# Patient Record
Sex: FEMALE | Race: OTHER
[De-identification: names, ages, dates, MRNs, and addresses within clinical notes are randomized per-mention and may not be internally consistent; named-entity substitution may affect disease eponyms.]

---

## 2021-08-10 ENCOUNTER — HOSPITAL ENCOUNTER (INPATIENT)
Dept: HOSPITAL 15 - ER | Age: 66
LOS: 2 days | Discharge: HOME | DRG: 241 | End: 2021-08-12
Attending: NURSE PRACTITIONER | Admitting: NURSE PRACTITIONER
Payer: MEDICAID

## 2021-08-10 VITALS — BODY MASS INDEX: 29.09 KG/M2 | HEIGHT: 65 IN | WEIGHT: 174.61 LBS

## 2021-08-10 VITALS — DIASTOLIC BLOOD PRESSURE: 62 MMHG | SYSTOLIC BLOOD PRESSURE: 117 MMHG

## 2021-08-10 VITALS — SYSTOLIC BLOOD PRESSURE: 114 MMHG | DIASTOLIC BLOOD PRESSURE: 54 MMHG

## 2021-08-10 VITALS — SYSTOLIC BLOOD PRESSURE: 108 MMHG | DIASTOLIC BLOOD PRESSURE: 36 MMHG

## 2021-08-10 VITALS — DIASTOLIC BLOOD PRESSURE: 44 MMHG | SYSTOLIC BLOOD PRESSURE: 101 MMHG

## 2021-08-10 VITALS — DIASTOLIC BLOOD PRESSURE: 51 MMHG | SYSTOLIC BLOOD PRESSURE: 118 MMHG

## 2021-08-10 VITALS — DIASTOLIC BLOOD PRESSURE: 69 MMHG | SYSTOLIC BLOOD PRESSURE: 120 MMHG

## 2021-08-10 DIAGNOSIS — Z20.822: ICD-10-CM

## 2021-08-10 DIAGNOSIS — K44.9: ICD-10-CM

## 2021-08-10 DIAGNOSIS — D50.9: ICD-10-CM

## 2021-08-10 DIAGNOSIS — K29.91: ICD-10-CM

## 2021-08-10 DIAGNOSIS — K27.4: Primary | ICD-10-CM

## 2021-08-10 DIAGNOSIS — N39.0: ICD-10-CM

## 2021-08-10 DIAGNOSIS — K29.71: ICD-10-CM

## 2021-08-10 LAB
ALBUMIN SERPL-MCNC: 3.3 G/DL (ref 3.4–5)
ALP SERPL-CCNC: 171 U/L (ref 45–117)
ALT SERPL-CCNC: 36 U/L (ref 13–56)
ANION GAP SERPL CALCULATED.3IONS-SCNC: 1 MMOL/L (ref 5–15)
BILIRUB SERPL-MCNC: 0.5 MG/DL (ref 0.2–1)
BUN SERPL-MCNC: 20 MG/DL (ref 7–18)
BUN/CREAT SERPL: 29.9
CALCIUM SERPL-MCNC: 8.6 MG/DL (ref 8.5–10.1)
CHLORIDE SERPL-SCNC: 116 MMOL/L (ref 98–107)
CO2 SERPL-SCNC: 26 MMOL/L (ref 21–32)
CRYSTALS UR QL AUTO: (no result) /HPF
GLUCOSE SERPL-MCNC: 89 MG/DL (ref 74–106)
HCT VFR BLD AUTO: 21 % (ref 36–46)
HGB BLD-MCNC: 6.8 G/DL (ref 12.2–16.2)
MCH RBC QN AUTO: 24.9 PG (ref 28–32)
MCV RBC AUTO: 77.2 FL (ref 80–100)
NRBC BLD QL AUTO: 0.1 %
POTASSIUM SERPL-SCNC: 3.7 MMOL/L (ref 3.5–5.1)
PROT SERPL-MCNC: 6.8 G/DL (ref 6.4–8.2)
SODIUM SERPL-SCNC: 143 MMOL/L (ref 136–145)

## 2021-08-10 PROCEDURE — 30230N1 TRANSFUSION OF NONAUTOLOGOUS RED BLOOD CELLS INTO PERIPHERAL VEIN, OPEN APPROACH: ICD-10-PCS | Performed by: INTERNAL MEDICINE

## 2021-08-10 PROCEDURE — 86803 HEPATITIS C AB TEST: CPT

## 2021-08-10 PROCEDURE — 76705 ECHO EXAM OF ABDOMEN: CPT

## 2021-08-10 PROCEDURE — 36430 TRANSFUSION BLD/BLD COMPNT: CPT

## 2021-08-10 PROCEDURE — 86708 HEPATITIS A ANTIBODY: CPT

## 2021-08-10 PROCEDURE — 36415 COLL VENOUS BLD VENIPUNCTURE: CPT

## 2021-08-10 PROCEDURE — 80053 COMPREHEN METABOLIC PANEL: CPT

## 2021-08-10 PROCEDURE — 96374 THER/PROPH/DIAG INJ IV PUSH: CPT

## 2021-08-10 PROCEDURE — 86900 BLOOD TYPING SEROLOGIC ABO: CPT

## 2021-08-10 PROCEDURE — 82607 VITAMIN B-12: CPT

## 2021-08-10 PROCEDURE — 82270 OCCULT BLOOD FECES: CPT

## 2021-08-10 PROCEDURE — 93005 ELECTROCARDIOGRAM TRACING: CPT

## 2021-08-10 PROCEDURE — 81001 URINALYSIS AUTO W/SCOPE: CPT

## 2021-08-10 PROCEDURE — 86704 HEP B CORE ANTIBODY TOTAL: CPT

## 2021-08-10 PROCEDURE — 85610 PROTHROMBIN TIME: CPT

## 2021-08-10 PROCEDURE — 82728 ASSAY OF FERRITIN: CPT

## 2021-08-10 PROCEDURE — 84443 ASSAY THYROID STIM HORMONE: CPT

## 2021-08-10 PROCEDURE — 86706 HEP B SURFACE ANTIBODY: CPT

## 2021-08-10 PROCEDURE — 86038 ANTINUCLEAR ANTIBODIES: CPT

## 2021-08-10 PROCEDURE — 43239 EGD BIOPSY SINGLE/MULTIPLE: CPT

## 2021-08-10 PROCEDURE — 87340 HEPATITIS B SURFACE AG IA: CPT

## 2021-08-10 PROCEDURE — 83550 IRON BINDING TEST: CPT

## 2021-08-10 PROCEDURE — 86920 COMPATIBILITY TEST SPIN: CPT

## 2021-08-10 PROCEDURE — 83540 ASSAY OF IRON: CPT

## 2021-08-10 PROCEDURE — 96375 TX/PRO/DX INJ NEW DRUG ADDON: CPT

## 2021-08-10 PROCEDURE — 85730 THROMBOPLASTIN TIME PARTIAL: CPT

## 2021-08-10 PROCEDURE — 86850 RBC ANTIBODY SCREEN: CPT

## 2021-08-10 PROCEDURE — 86901 BLOOD TYPING SEROLOGIC RH(D): CPT

## 2021-08-10 PROCEDURE — 87426 SARSCOV CORONAVIRUS AG IA: CPT

## 2021-08-10 PROCEDURE — 74176 CT ABD & PELVIS W/O CONTRAST: CPT

## 2021-08-10 PROCEDURE — 80048 BASIC METABOLIC PNL TOTAL CA: CPT

## 2021-08-10 PROCEDURE — 87086 URINE CULTURE/COLONY COUNT: CPT

## 2021-08-10 PROCEDURE — 85025 COMPLETE CBC W/AUTO DIFF WBC: CPT

## 2021-08-10 RX ADMIN — PANTOPRAZOLE SODIUM SCH MG: 40 INJECTION, POWDER, FOR SOLUTION INTRAVENOUS at 21:41

## 2021-08-11 VITALS — SYSTOLIC BLOOD PRESSURE: 116 MMHG | DIASTOLIC BLOOD PRESSURE: 60 MMHG

## 2021-08-11 VITALS — DIASTOLIC BLOOD PRESSURE: 70 MMHG | SYSTOLIC BLOOD PRESSURE: 120 MMHG

## 2021-08-11 VITALS — SYSTOLIC BLOOD PRESSURE: 114 MMHG | DIASTOLIC BLOOD PRESSURE: 59 MMHG

## 2021-08-11 VITALS — DIASTOLIC BLOOD PRESSURE: 66 MMHG | SYSTOLIC BLOOD PRESSURE: 107 MMHG

## 2021-08-11 VITALS — SYSTOLIC BLOOD PRESSURE: 126 MMHG | DIASTOLIC BLOOD PRESSURE: 69 MMHG

## 2021-08-11 VITALS — DIASTOLIC BLOOD PRESSURE: 60 MMHG | SYSTOLIC BLOOD PRESSURE: 116 MMHG

## 2021-08-11 LAB
ANION GAP SERPL CALCULATED.3IONS-SCNC: 5 MMOL/L (ref 5–15)
BUN SERPL-MCNC: 14 MG/DL (ref 7–18)
BUN/CREAT SERPL: 21.2
CALCIUM SERPL-MCNC: 8.3 MG/DL (ref 8.5–10.1)
CHLORIDE SERPL-SCNC: 114 MMOL/L (ref 98–107)
CO2 SERPL-SCNC: 24 MMOL/L (ref 21–32)
GLUCOSE SERPL-MCNC: 82 MG/DL (ref 74–106)
HCT VFR BLD AUTO: 25.8 % (ref 36–46)
HGB BLD-MCNC: 8.9 G/DL (ref 12.2–16.2)
IRON SERPL-MCNC: 32 UG/DL (ref 50–170)
MCH RBC QN AUTO: 27 PG (ref 28–32)
MCV RBC AUTO: 78.9 FL (ref 80–100)
NRBC BLD QL AUTO: 0 %
POTASSIUM SERPL-SCNC: 3.6 MMOL/L (ref 3.5–5.1)
SODIUM SERPL-SCNC: 143 MMOL/L (ref 136–145)
TIBC SERPL-MCNC: 415 UG/DL (ref 250–450)

## 2021-08-11 PROCEDURE — 0DB98ZX EXCISION OF DUODENUM, VIA NATURAL OR ARTIFICIAL OPENING ENDOSCOPIC, DIAGNOSTIC: ICD-10-PCS | Performed by: INTERNAL MEDICINE

## 2021-08-11 PROCEDURE — 0DB68ZX EXCISION OF STOMACH, VIA NATURAL OR ARTIFICIAL OPENING ENDOSCOPIC, DIAGNOSTIC: ICD-10-PCS | Performed by: INTERNAL MEDICINE

## 2021-08-11 RX ADMIN — PANTOPRAZOLE SODIUM SCH MG: 40 INJECTION, POWDER, FOR SOLUTION INTRAVENOUS at 09:50

## 2021-08-11 RX ADMIN — MIDAZOLAM HYDROCHLORIDE ONE MG: 5 INJECTION INTRAMUSCULAR; INTRAVENOUS at 17:34

## 2021-08-11 RX ADMIN — SODIUM CHLORIDE SCH MLS/HR: 0.9 INJECTION, SOLUTION INTRAVENOUS at 12:50

## 2021-08-11 RX ADMIN — FENTANYL CITRATE ONE MCG: 50 INJECTION, SOLUTION INTRAMUSCULAR; INTRAVENOUS at 17:37

## 2021-08-11 RX ADMIN — FENTANYL CITRATE ONE MCG: 50 INJECTION, SOLUTION INTRAMUSCULAR; INTRAVENOUS at 17:34

## 2021-08-11 RX ADMIN — MIDAZOLAM HYDROCHLORIDE ONE MG: 5 INJECTION INTRAMUSCULAR; INTRAVENOUS at 17:37

## 2021-08-11 RX ADMIN — CEFTRIAXONE SODIUM SCH MLS/HR: 1 INJECTION, POWDER, FOR SOLUTION INTRAMUSCULAR; INTRAVENOUS at 08:45

## 2021-08-12 VITALS — DIASTOLIC BLOOD PRESSURE: 90 MMHG | SYSTOLIC BLOOD PRESSURE: 138 MMHG

## 2021-08-12 VITALS — SYSTOLIC BLOOD PRESSURE: 108 MMHG | DIASTOLIC BLOOD PRESSURE: 58 MMHG

## 2021-08-12 VITALS — SYSTOLIC BLOOD PRESSURE: 138 MMHG | DIASTOLIC BLOOD PRESSURE: 90 MMHG

## 2021-08-12 VITALS — SYSTOLIC BLOOD PRESSURE: 127 MMHG | DIASTOLIC BLOOD PRESSURE: 68 MMHG

## 2021-08-12 VITALS — DIASTOLIC BLOOD PRESSURE: 65 MMHG | SYSTOLIC BLOOD PRESSURE: 122 MMHG

## 2021-08-12 LAB
ALBUMIN SERPL-MCNC: 3 G/DL (ref 3.4–5)
ALP SERPL-CCNC: 168 U/L (ref 45–117)
ALT SERPL-CCNC: 34 U/L (ref 13–56)
ANION GAP SERPL CALCULATED.3IONS-SCNC: 5 MMOL/L (ref 5–15)
APTT PPP: 21.4 SEC (ref 23.6–33)
BILIRUB SERPL-MCNC: 0.8 MG/DL (ref 0.2–1)
BUN SERPL-MCNC: 13 MG/DL (ref 7–18)
BUN/CREAT SERPL: 18.8
CALCIUM SERPL-MCNC: 8 MG/DL (ref 8.5–10.1)
CHLORIDE SERPL-SCNC: 111 MMOL/L (ref 98–107)
CO2 SERPL-SCNC: 25 MMOL/L (ref 21–32)
GLUCOSE SERPL-MCNC: 89 MG/DL (ref 74–106)
HCT VFR BLD AUTO: 26.8 % (ref 36–46)
HGB BLD-MCNC: 9 G/DL (ref 12.2–16.2)
INR PPP: 1.05 (ref 0.9–1.15)
MCH RBC QN AUTO: 26.7 PG (ref 28–32)
MCV RBC AUTO: 79.4 FL (ref 80–100)
NRBC BLD QL AUTO: 0.2 %
POTASSIUM SERPL-SCNC: 3.8 MMOL/L (ref 3.5–5.1)
PROT SERPL-MCNC: 6.1 G/DL (ref 6.4–8.2)
SODIUM SERPL-SCNC: 141 MMOL/L (ref 136–145)

## 2021-08-12 RX ADMIN — SODIUM CHLORIDE SCH MLS/HR: 0.9 INJECTION, SOLUTION INTRAVENOUS at 14:25

## 2021-08-12 RX ADMIN — CEFTRIAXONE SODIUM SCH MLS/HR: 1 INJECTION, POWDER, FOR SOLUTION INTRAMUSCULAR; INTRAVENOUS at 09:55

## 2021-08-12 RX ADMIN — PANTOPRAZOLE SODIUM SCH MG: 40 INJECTION, POWDER, FOR SOLUTION INTRAVENOUS at 00:00

## 2021-08-12 RX ADMIN — PANTOPRAZOLE SODIUM SCH MG: 40 INJECTION, POWDER, FOR SOLUTION INTRAVENOUS at 09:55

## 2021-08-12 RX ADMIN — SODIUM CHLORIDE SCH MLS/HR: 0.9 INJECTION, SOLUTION INTRAVENOUS at 01:05

## 2024-12-22 ENCOUNTER — HOSPITAL ENCOUNTER (INPATIENT)
Dept: HOSPITAL 15 - ER | Age: 69
LOS: 5 days | Discharge: HOME | DRG: 241 | End: 2024-12-27
Payer: MEDICAID

## 2024-12-22 VITALS — OXYGEN SATURATION: 99 % | HEART RATE: 62 BPM | RESPIRATION RATE: 16 BRPM

## 2024-12-22 VITALS — BODY MASS INDEX: 28.72 KG/M2 | WEIGHT: 156.09 LBS | HEIGHT: 62 IN

## 2024-12-22 DIAGNOSIS — Z90.49: ICD-10-CM

## 2024-12-22 DIAGNOSIS — M34.9: ICD-10-CM

## 2024-12-22 DIAGNOSIS — K76.0: ICD-10-CM

## 2024-12-22 DIAGNOSIS — R18.8: ICD-10-CM

## 2024-12-22 DIAGNOSIS — K44.9: ICD-10-CM

## 2024-12-22 DIAGNOSIS — K74.60: ICD-10-CM

## 2024-12-22 DIAGNOSIS — K25.4: Primary | ICD-10-CM

## 2024-12-22 DIAGNOSIS — K76.6: ICD-10-CM

## 2024-12-22 DIAGNOSIS — K22.11: ICD-10-CM

## 2024-12-22 DIAGNOSIS — K75.4: ICD-10-CM

## 2024-12-22 DIAGNOSIS — N18.9: ICD-10-CM

## 2024-12-22 DIAGNOSIS — N17.0: ICD-10-CM

## 2024-12-22 DIAGNOSIS — E80.6: ICD-10-CM

## 2024-12-22 DIAGNOSIS — K31.9: ICD-10-CM

## 2024-12-22 DIAGNOSIS — D62: ICD-10-CM

## 2024-12-22 DIAGNOSIS — I85.11: ICD-10-CM

## 2024-12-22 LAB
ALBUMIN SERPL-MCNC: 3.9 G/DL (ref 3.2–4.8)
ALP SERPL-CCNC: 231 U/L (ref 46–116)
ALT SERPL-CCNC: 33 U/L (ref 7–40)
ANION GAP SERPL CALCULATED.3IONS-SCNC: 10 MMOL/L (ref 5–15)
BILIRUB SERPL-MCNC: 4.8 MG/DL (ref 0.2–1)
BUN SERPL-MCNC: 15 MG/DL (ref 9–23)
BUN/CREAT SERPL: 12.7 (ref 10–20)
CALCIUM SERPL-MCNC: 9.5 MG/DL (ref 8.7–10.4)
CHLORIDE SERPL-SCNC: 110 MMOL/L (ref 98–107)
CO2 SERPL-SCNC: 19 MMOL/L (ref 20–31)
GLUCOSE SERPL-MCNC: 128 MG/DL (ref 74–106)
HCT VFR BLD AUTO: 24.6 % (ref 36–46)
HGB BLD-MCNC: 7.6 G/DL (ref 12.2–16.2)
INR PPP: 1.03 (ref 0.9–1.15)
MCH RBC QN AUTO: 36.8 PG (ref 28–32)
MCV RBC AUTO: 119.3 FL (ref 80–100)
NRBC BLD QL AUTO: 0.5 %
POTASSIUM SERPL-SCNC: 4.3 MMOL/L (ref 3.5–5.1)
PROT SERPL-MCNC: 6.4 G/DL (ref 5.7–8.2)
PROTHROMBIN TIME: 10.9 SEC (ref 9.3–11.8)
SODIUM SERPL-SCNC: 139 MMOL/L (ref 136–145)

## 2024-12-22 PROCEDURE — 86235 NUCLEAR ANTIGEN ANTIBODY: CPT

## 2024-12-22 PROCEDURE — 80053 COMPREHEN METABOLIC PANEL: CPT

## 2024-12-22 PROCEDURE — 43239 EGD BIOPSY SINGLE/MULTIPLE: CPT

## 2024-12-22 PROCEDURE — 86803 HEPATITIS C AB TEST: CPT

## 2024-12-22 PROCEDURE — 86901 BLOOD TYPING SEROLOGIC RH(D): CPT

## 2024-12-22 PROCEDURE — 86970 RBC PRETX INCUBATJ W/CHEMICL: CPT

## 2024-12-22 PROCEDURE — 82140 ASSAY OF AMMONIA: CPT

## 2024-12-22 PROCEDURE — 86906 BLD TYPING SEROLOGIC RH PHNT: CPT

## 2024-12-22 PROCEDURE — 81001 URINALYSIS AUTO W/SCOPE: CPT

## 2024-12-22 PROCEDURE — 86922 COMPATIBILITY TEST ANTIGLOB: CPT

## 2024-12-22 PROCEDURE — 82607 VITAMIN B-12: CPT

## 2024-12-22 PROCEDURE — 82728 ASSAY OF FERRITIN: CPT

## 2024-12-22 PROCEDURE — 85025 COMPLETE CBC W/AUTO DIFF WBC: CPT

## 2024-12-22 PROCEDURE — 76700 US EXAM ABDOM COMPLETE: CPT

## 2024-12-22 PROCEDURE — 85610 PROTHROMBIN TIME: CPT

## 2024-12-22 PROCEDURE — 86978 RBC PRETREATMENT SERUM: CPT

## 2024-12-22 PROCEDURE — 87086 URINE CULTURE/COLONY COUNT: CPT

## 2024-12-22 PROCEDURE — 71045 X-RAY EXAM CHEST 1 VIEW: CPT

## 2024-12-22 PROCEDURE — 87340 HEPATITIS B SURFACE AG IA: CPT

## 2024-12-22 PROCEDURE — 84443 ASSAY THYROID STIM HORMONE: CPT

## 2024-12-22 PROCEDURE — 99291 CRITICAL CARE FIRST HOUR: CPT

## 2024-12-22 PROCEDURE — 86860 RBC ANTIBODY ELUTION: CPT

## 2024-12-22 PROCEDURE — 36415 COLL VENOUS BLD VENIPUNCTURE: CPT

## 2024-12-22 PROCEDURE — 83516 IMMUNOASSAY NONANTIBODY: CPT

## 2024-12-22 PROCEDURE — 85007 BL SMEAR W/DIFF WBC COUNT: CPT

## 2024-12-22 PROCEDURE — 85048 AUTOMATED LEUKOCYTE COUNT: CPT

## 2024-12-22 PROCEDURE — 86971 RBC PRETX INCUBATJ W/ENZYMES: CPT

## 2024-12-22 PROCEDURE — 83036 HEMOGLOBIN GLYCOSYLATED A1C: CPT

## 2024-12-22 PROCEDURE — 86708 HEPATITIS A ANTIBODY: CPT

## 2024-12-22 PROCEDURE — 86850 RBC ANTIBODY SCREEN: CPT

## 2024-12-22 PROCEDURE — 86900 BLOOD TYPING SEROLOGIC ABO: CPT

## 2024-12-22 PROCEDURE — 87493 C DIFF AMPLIFIED PROBE: CPT

## 2024-12-22 PROCEDURE — 87427 SHIGA-LIKE TOXIN AG IA: CPT

## 2024-12-22 PROCEDURE — 83550 IRON BINDING TEST: CPT

## 2024-12-22 PROCEDURE — 80048 BASIC METABOLIC PNL TOTAL CA: CPT

## 2024-12-22 PROCEDURE — 85014 HEMATOCRIT: CPT

## 2024-12-22 PROCEDURE — 87045 FECES CULTURE AEROBIC BACT: CPT

## 2024-12-22 PROCEDURE — 85027 COMPLETE CBC AUTOMATED: CPT

## 2024-12-22 PROCEDURE — 87081 CULTURE SCREEN ONLY: CPT

## 2024-12-22 PROCEDURE — 86704 HEP B CORE ANTIBODY TOTAL: CPT

## 2024-12-22 PROCEDURE — 86225 DNA ANTIBODY NATIVE: CPT

## 2024-12-22 PROCEDURE — 86880 COOMBS TEST DIRECT: CPT

## 2024-12-22 PROCEDURE — 86870 RBC ANTIBODY IDENTIFICATION: CPT

## 2024-12-22 PROCEDURE — 82105 ALPHA-FETOPROTEIN SERUM: CPT

## 2024-12-22 PROCEDURE — 86706 HEP B SURFACE ANTIBODY: CPT

## 2024-12-22 PROCEDURE — 83880 ASSAY OF NATRIURETIC PEPTIDE: CPT

## 2024-12-22 PROCEDURE — 82746 ASSAY OF FOLIC ACID SERUM: CPT

## 2024-12-22 PROCEDURE — 83540 ASSAY OF IRON: CPT

## 2024-12-22 PROCEDURE — 86905 BLOOD TYPING RBC ANTIGENS: CPT

## 2024-12-22 PROCEDURE — 85018 HEMOGLOBIN: CPT

## 2024-12-22 NOTE — DV[facility]
CHEST RADIOGRAPH



Indication: chest pain



Technique: Single frontal view of the chest was obtained



COMPARISON: None



FINDINGS: 



Lines and Tubes: None



Lungs: Clear



Pleura: No effusion.



No pneumothorax. 



Cardiomediastinal contours: Unremarkable



Bones: Unremarkable



IMPRESSION: 



1. No acute disease. 



 



Electronically Signed by: Harman Blackman at 12/22/2024 21:29:06 PM

## 2024-12-22 NOTE — ED.PDOC
GI ASSESSMENT


HPI Comments


This 71-year-old female with a past medical history significant for multiple 

esophageal variceal bleed requiring transplant presents to the emergency room 

secondary to a several day history of increasing weakness and shortness of 

breath.  The patient appears pale and complains of feeling cold.  She endorses 

bloody stool and hematuria.  She has no other complaints at his headaches and 

vision changes, chest pain, flank pain, abdominal pain, nausea, vomiting or 

diarrhea.


Chief Complaint:  GI Bleed


Time Seen by MD:  15:09


Primary Care Provider:  SILVANA


Allergies:  


Coded Allergies:  


     NO KNOWN ALLERGIES (Unverified , 8/10/21)


Home Meds


No Active Prescriptions or Reported Meds


Mode of Arrival:  Ambulatory





Past Medical History


PAST MEDICAL HISTORY:  Anemia, Liver


Surgical History:  Denies all surgeries


GYN History:  No Pertinent GYN History





Family History


Family History:  Reviewed,noncontributory to illness





Social History


Smoker:  Non-Smoker


Alcohol:  Denies ETOH Use


Drugs:  Denies Drug Use


Lives In:  Home





Constitutional:  reports: chills, fatigue, malaise, weakness; denies: fever, 

sweats


EENTM:  denies: blurred vision, double vision, ear bleeding, ear discharge, ear 

drainage, ear pain, ear ringing, eye pain, eye redness, hearing loss, mouth 

pain, mouth swelling, nasal discharge, nose bleeding, nose congestion, nose 

pain, photophobia, tearing, throat pain, throat swelling, voice changes, others


Respiratory:  denies: cough, hemoptysis, orthopnea, SOB at rest, shortness of 

breath, SOB with excertion, stridor, wheezing, others


Cardiovascular:  reports: dizzy spells, Dyspnea on exertion, lightheadedness, 

palpitations; denies: chest pain, diaphoresis, edema, irregular heart beat, left

arm pain, PND, syncope, others


Gastrointestinal:  reports: blood streaked bowels, melena, rectal bleeding; 

denies: abdomen distended, abdominal pain, constipated, diarrhea, dysphagia, 

difficulty swallowing, hematemesis, nausea, poor appetite, poor fluid intake, 

rectal pain, vomiting, others


Genitourinary:  reports: hematuria; denies: abnormal vagina bleeding, burning, 

dyspareunia, dysuria, flank pain, frequency, incontinence, pain, pregnant, 

vagina discharge, urgency, others


Neurological:  reports: dizziness, weakness; denies: fainting, numbness, 

paresthesia, pre-existing deficit, seizure, tingling, tremors


Musculoskeletal:  reports: gout, joint pain, joint swelling, muscle pain, muscle

stiffness


Integumetry:  reports: bruises, rash


Hematologic/Lymphatic:  reports: anemia


Endocrine:  denies: excessive hunger, excessive sweating, excessive thirst, 

excessive urination, flushing, intolerance to cold, intolerance to heat, 

unexplained weight gain, unexplained weight loss, others


Psychiatric:  denies: anxiety, bipolar disorder, depression, hopeless, panic 

disorder, schizophrenia, sleepless, suicidal, others





Physical Exam


General Appearance:  Mild Distress


HEENT:  Normal ENT Inspection, Pharynx Normal, TMs Normal


Neck:  Full Range of Motion, Non-Tender, Normal, Normal Inspection


Respiratory:  Chest Non-Tender, Lungs Clear, No Accessory Muscle Use, No 

Respiratory Distress, Normal Breath Sounds


Cardiovascular:  No Edema, No JVD, No Murmur, No Gallop, Normal Peripheral Pu

lses, Regular Rate/Rhythm


Breast Exam:  Deferred


Gastrointestinal:  No Organomegaly, Non Tender, No Pulsatile Mass, Normal Bowel 

Sounds, Soft


Genitalia:  Deferred


Pelvic:  Deferred


Rectal:  Deferred


Extremities:  No calf tenderness, Normal capillary refill, Normal inspection, 

Normal range of motion, Non-tender, No pedal edema


Neurologic:  Alert, CNs II-XII nml as Tested, No Motor Deficits, Normal Affect, 

Normal Mood, No Sensory Deficits


Cerebellar Function:  NOT DONE


Reflexes:  Normal


Skin:  Dry, Normal Color, Pallor, Warm


Lymphatic:  NOT DONE





Was a procedure done?


Was a procedure done?:  No





GI differential Dx


Differential Diagnosis:  Dysmenorrhea, Esophageal rupture, Esophagitis, 

Gastroenteritis, GI hemorrhage, Ischemic Bowel, Pancreatitis, Trauma 

intraabdominal, UTI, Drug toxicity, Ischemic Bowel, Esophageal Varicies





X-Ray, Labs, Meds, VS





                                   Vital Signs








  Date Time  Temp Pulse Resp B/P (MAP) Pulse Ox O2 Delivery O2 Flow Rate FiO2


 


12/22/24 14:22 98.0 71 18 134/83 (100) 94   








                                       Lab








Test


 12/22/24


14:43 Range/Units


 


 


White Blood Count


 3.3 L


 4.4-10.8


10^3/uL


 


Red Blood Count


 2.06 L


 4.0-5.20


10^6/uL


 


Hemoglobin 7.6 L 12.2-16.2  g/dL


 


Hematocrit 24.6 L 36.0-46.0  %


 


Mean Corpuscular Volume 119.3 H 80.0-100.0  fL


 


Mean Corpuscular Hemoglobin 36.8 H 28.0-32.0  pg


 


Mean Corpuscular Hemoglobin


Concent 30.8 L


 32.0-36.0  g/dL





 


Red Cell Distribution Width 16.6 H 11.8-14.3  %


 


Platelet Count


 142 


 140-450


10^3/uL


 


Mean Platelet Volume 8.3  6.9-10.8  fL


 


Neutrophils (%) (Auto) 73.1  37.0-80.0  %


 


Lymphocytes (%) (Auto) 12.9  10.0-50.0  %


 


Monocytes (%) (Auto) 8.9  0.0-12.0  %


 


Eosinophils (%) (Auto) 3.8  0.0-7.0  %


 


Basophils (%) (Auto) 1.3  0.0-2.0  %


 


Neutrophils # (Auto)


 2.4 


 1.6-8.6  10


^3/uL


 


Lymphocytes # (Auto)


 0.4 


 0.4-5.4  10


^3/uL


 


Monocytes # (Auto) 0.3  0-1.3  10 ^3/uL


 


Eosinophils # (Auto) 0.1  0-0.8  10 ^3/uL


 


Basophils # (Auto) 0  0-0.2  10 ^3/uL


 


Nucleated Red Blood Cells 0.5   %


 


Sodium Level 139  136-145  mmol/L


 


Potassium Level 4.3  3.5-5.1  mmol/L


 


Chloride Level 110 H   mmol/L


 


Carbon Dioxide Level 19 L 20-31  mmol/L


 


Anion Gap 10  5-15  


 


Blood Urea Nitrogen 15  9-23  mg/dL


 


Creatinine


 1.18 H


 0.550-1.02


mg/dL


 


Glomerular Filtration Rate


Calc 51 


 >90  mL/min





 


BUN/Creatinine Ratio 12.7  10.0-20.0  


 


Serum Glucose 128 H   mg/dL


 


Calcium Level 9.5  8.7-10.4  mg/dL


 


Total Bilirubin 4.8 H 0.2-1.0  mg/dL


 


Aspartate Amino Transferase


(AST) 69 H


 13-40  U/L





 


Alanine Aminotransferase (ALT) 33  7-40  U/L


 


Alkaline Phosphatase 231 H   U/L


 


Total Protein 6.4  5.7-8.2  g/dL


 


Albumin 3.9  3.2-4.8  g/dL








X-Ray, Labs, Meds, VS Comment


This 70-year-old female with a past medical history significant for esophageal 

varices GI bleed presents secondary to feeling weak.  Here, she was noted to be 

pale and have a hemoglobin 7.6.  She was typed and crossed for 2 units.  The 

patient's daughter states she has a history of fatty liver but does not have a

history of cirrhosis.  Here, she noted to have an elevated AST and bilirubin 

4.8.  CT scan of the abdomen pelvis was ordered.  Secondary to the patient's 

symptomatic anemia in hepatitis, she will be admitted for further workup nivia garay of her GI bleed.


Time of 1ST Reevaluation:  16:27


Reevaluation 1ST:  Unchanged


Patient Education/Counseling:  Diagnosis, Treatment


Family Education/Counseling:  Diagnosis, Treatment





Departure 1


Departure


Impression:  


   Primary Impression:  


   Symptomatic anemia


   Additional Impressions:  


   Hepatitis


   Transaminitis


Disposition:  09 ADMITTED AS INPATIENT


Admit to:  Tele


Condition:  Serious


e-Prescriptions


No Active Prescriptions or Reported Meds





Critical Care Note


Critical Care Time?:  Yes (35 min-critical care time only)





Stability


Stability form required:  No





Heart Score


Heart Score:  








Heart Score Response (Comments) Value


 


History N/A 0


 


EKG N/A 0


 


Age N/A 0


 


Risk Factors N/A 0


 


Troponin N/A 0


 


Total  0

















SILVESTRE NAIK MD                 Dec 22, 2024 16:28

## 2024-12-22 NOTE — [facility]
INDICATION: Liver cirrhosis



TECHNIQUE:  Multiple real-time sonographic images of the abdomen were obtained.  



COMPARISON: None



FINDINGS: 



Cirrhotic liver with coarse contour and heterogeneous echogenicity. 



The liver measures 10.8 cm. 



No intrahepatic biliary ductal dilatation is noted. 



Status post cholecystectomy.



The common duct is not clearly visualized



Small volume ascites is seen.



The right kidney measures 9.0 cm. No hydronephrosis. The left kidney measures 9.2 cm. No hydronephros
is. Simple cyst in the left kidney measures up to 1.9 cm. 



The spleen measures 13.8 cm, within normal limits. The echogenicity is within normal limits.



The aorta is not visualized.



The pancreas is not well visualized due to obscuration from bowel gas.



The visualized portions of the IVC and aorta are grossly unremarkable.



IMPRESSION: 



1. Cirrhotic liver with small volume ascites seen throughout the abdomen.



Electronically Signed by: Harman Blackman at 12/22/2024 21:58:50 PM

## 2024-12-23 VITALS — OXYGEN SATURATION: 99 % | RESPIRATION RATE: 21 BRPM | HEART RATE: 77 BPM

## 2024-12-23 VITALS
HEART RATE: 69 BPM | SYSTOLIC BLOOD PRESSURE: 108 MMHG | RESPIRATION RATE: 18 BRPM | OXYGEN SATURATION: 98 % | DIASTOLIC BLOOD PRESSURE: 68 MMHG

## 2024-12-23 VITALS
RESPIRATION RATE: 15 BRPM | DIASTOLIC BLOOD PRESSURE: 49 MMHG | OXYGEN SATURATION: 96 % | HEART RATE: 67 BPM | TEMPERATURE: 97.9 F | SYSTOLIC BLOOD PRESSURE: 111 MMHG

## 2024-12-23 VITALS — HEART RATE: 74 BPM | RESPIRATION RATE: 16 BRPM | OXYGEN SATURATION: 98 %

## 2024-12-23 VITALS
SYSTOLIC BLOOD PRESSURE: 108 MMHG | TEMPERATURE: 98 F | HEART RATE: 72 BPM | OXYGEN SATURATION: 96 % | DIASTOLIC BLOOD PRESSURE: 68 MMHG | RESPIRATION RATE: 16 BRPM

## 2024-12-23 VITALS — HEART RATE: 66 BPM | OXYGEN SATURATION: 100 % | RESPIRATION RATE: 22 BRPM

## 2024-12-23 LAB
ALBUMIN SERPL-MCNC: 3.6 G/DL (ref 3.2–4.8)
ALP SERPL-CCNC: 208 U/L (ref 46–116)
ALT SERPL-CCNC: 31 U/L (ref 7–40)
ANION GAP SERPL CALCULATED.3IONS-SCNC: 10 MMOL/L (ref 5–15)
BILIRUB SERPL-MCNC: 4.8 MG/DL (ref 0.2–1)
BUN SERPL-MCNC: 17 MG/DL (ref 9–23)
BUN/CREAT SERPL: 16 (ref 10–20)
CALCIUM SERPL-MCNC: 9.4 MG/DL (ref 8.7–10.4)
CHLORIDE SERPL-SCNC: 110 MMOL/L (ref 98–107)
CO2 SERPL-SCNC: 20 MMOL/L (ref 20–31)
GLUCOSE SERPL-MCNC: 89 MG/DL (ref 74–106)
HCT VFR BLD AUTO: 21.6 % (ref 36–46)
HCT VFR BLD AUTO: 23.6 % (ref 36–46)
HGB BLD-MCNC: 6.9 G/DL (ref 12.2–16.2)
HGB BLD-MCNC: 7.1 G/DL (ref 12.2–16.2)
MCH RBC QN AUTO: 37.4 PG (ref 28–32)
MCV RBC AUTO: 118 FL (ref 80–100)
NRBC BLD QL AUTO: 0.3 %
POTASSIUM SERPL-SCNC: 3.9 MMOL/L (ref 3.5–5.1)
PROT SERPL-MCNC: 6 G/DL (ref 5.7–8.2)
SODIUM SERPL-SCNC: 140 MMOL/L (ref 136–145)

## 2024-12-23 PROCEDURE — 0DB68ZX EXCISION OF STOMACH, VIA NATURAL OR ARTIFICIAL OPENING ENDOSCOPIC, DIAGNOSTIC: ICD-10-PCS | Performed by: INTERNAL MEDICINE

## 2024-12-23 PROCEDURE — 0DB98ZX EXCISION OF DUODENUM, VIA NATURAL OR ARTIFICIAL OPENING ENDOSCOPIC, DIAGNOSTIC: ICD-10-PCS | Performed by: INTERNAL MEDICINE

## 2024-12-23 RX ADMIN — FENTANYL CITRATE ONE MCG: 50 INJECTION, SOLUTION INTRAMUSCULAR; INTRAVENOUS at 15:47

## 2024-12-23 RX ADMIN — FUROSEMIDE SCH MG: 40 TABLET ORAL at 10:00

## 2024-12-23 RX ADMIN — PANTOPRAZOLE SODIUM SCH MG: 40 INJECTION, POWDER, FOR SOLUTION INTRAVENOUS at 10:05

## 2024-12-23 RX ADMIN — PANTOPRAZOLE SODIUM ONE MG: 40 INJECTION, POWDER, FOR SOLUTION INTRAVENOUS at 00:24

## 2024-12-23 RX ADMIN — SPIRONOLACTONE SCH MG: 25 TABLET, FILM COATED ORAL at 10:00

## 2024-12-23 RX ADMIN — DIPHENHYDRAMINE HYDROCHLORIDE ONE MG: 50 INJECTION INTRAMUSCULAR; INTRAVENOUS at 15:47

## 2024-12-23 RX ADMIN — PROPRANOLOL HYDROCHLORIDE SCH MG: 20 TABLET ORAL at 10:00

## 2024-12-23 RX ADMIN — CEFTRIAXONE SODIUM SCH MLS/HR: 1 INJECTION, POWDER, FOR SOLUTION INTRAMUSCULAR; INTRAVENOUS at 00:25

## 2024-12-23 RX ADMIN — LIDOCAINE HYDROCHLORIDE ONE ML: 20 SOLUTION ORAL; TOPICAL at 15:43

## 2024-12-23 RX ADMIN — MIDAZOLAM HYDROCHLORIDE ONE MG: 5 INJECTION INTRAMUSCULAR; INTRAVENOUS at 15:47

## 2024-12-23 RX ADMIN — Medication SCH MG: at 21:59

## 2024-12-23 NOTE — DVHPN2
Subjective


Pt has c/o realized weakness


Patient complains of abdominal inflammation for about a week, upper abdomen 

radiating to her back


Patient has nausea, no vomiting.  No hematemesis


Last BM this morning, no melena or red blood in stool


Patient has noticed tarry stool about a week ago


Patient has history of anemia requiring blood transfusion in the past


Changes from previous H/P or p:  No Changes


Eyes:  No Pain, No Vision change, No Conjunctivae inflammation, No Eyelid 

inflammation, No Other, No Redness


ENT:  No Ear pain, No Ear discharge, No Nose pain, No Nose discharge, No Nose 

congestion, No Mouth pain, No Mouth swelling, No Throat pain, No Throat 

swelling, No Other


Cardiovascular:  No Chest Pain, No Palpitations, No Orthopnea, No Paroxysmal 

Noc. Dyspnea, No Edema, No Lt Headedness, No Other


Respiratory:  No Cough, No Dry, No Shortness of breath, No SOB with excertion, 

No Wheezing, No Hemoptysis, No Pleuritic Pain, No Sputum, No Other


Gastrointestinal:  No Nausea, No Vomiting; Abdominal Pain; No Diarrhea, No 

Constipation; Melena; No Hematochezia, No Other


Genitourinary:  No Dysuria, No Frequency, No Incontinence; Hematuria; No 

Retention, No Other


Musculoskeletal:  No other, No neck pain, No shoulder pain, No arm pain, No back

pain, No hand pain, No leg pain, No foot pain


Skin:  No Rash, No Lesions, No Jaundice, No Bruising, No Other





Objective


Vitals





Vital Signs








  Date Time  Temp Pulse Resp B/P (MAP) Pulse Ox O2 Delivery O2 Flow Rate FiO2


 


12/23/24 10:00    100/48    


 


12/23/24 10:00  66 20  96   


 


12/23/24 08:39      Room Air* 0 21


 


12/23/24 08:10 98.1       





 98.1       








General Appearance:  Alert, Oriented X3, No acute distress


Lungs:  Clear to auscultation, Normal air movement, Other


Cardiovascular:  Regular rate, Normal S1, Normal S2, No murmurs, Gallops, Rubs, 

Other


Abdomen:  Normal bowel sounds, Soft, No tenderness (Mild upper abdomen 

tenderness.  Mild distention noted), No hepatospenomegaly, No masses, Other


Medications





                               Current Medications








 Medications  Dose


 Ordered  Sig/Kiran


 Route  Start Time


 Stop Time Status Last Admin


Dose Admin


 


 Pantoprazole


 Sodium  40 mg  BID


 IV  12/23/24 10:00


    12/23/24 10:05


40 MG


 


 Ceftriaxone Sodium  50 ml @ 


 100 mls/hr  DAILY


 IV  12/22/24 22:00


    12/23/24 00:25


100 MLS/HR


 


 Propranolol HCl  10 mg  DAILY


 PO  12/23/24 10:00


     





 


 Furosemide  40 mg  DAILY


 PO  12/23/24 10:00


     





 


 Spironolactone  100 mg  DAILY


 PO  12/23/24 10:00


     














Laboratory Results


Laboratory Tests


12/23/24 03:31








12/23/24 07:45











Chemistry








Test


 12/22/24


14:43 12/23/24


03:31


 


Albumin


 3.9 g/dL


(3.2-4.8) 3.6 g/dL


(3.2-4.8)


 


Calcium Level


 9.5 mg/dL


(8.7-10.4) 9.4 mg/dL


(8.7-10.4)


 


Total Protein


 6.4 g/dL


(5.7-8.2) 6.0 g/dL


(5.7-8.2)








Coagulation








Test


 12/22/24


16:19


 


Prothrombin Time


 10.9 sec


(9.3-11.8)


 


Prothrombin Time INR


 1.03


(0.9-1.15)








Cardiac Markers








Test


 12/22/24


14:43


 


B-Type Natriuretic Peptide


 311.63 pg/mL


(0-100)








LFT








Test


 12/22/24


14:43 12/23/24


03:31


 


Alanine Aminotransferase (ALT) 33 U/L (7-40)   31 U/L (7-40)  


 


Alkaline Phosphatase


 231 U/L


()  H 208 U/L


()  H


 


Aspartate Amino Transferase


(AST) 69 U/L (13-40)


H 61 U/L (13-40)


H


 


Total Bilirubin


 4.8 mg/dL


(0.2-1.0)  H 4.8 mg/dL


(0.2-1.0)  H








HgA1c, TSH








Test


 12/22/24


14:43


 


Hemoglobin A1c


 < 5.7 % A1C


(<5.7)


 


Thyroid Stimulating Hormone


(TSH) 2.30 uIU/mL


(0.55-4.78)








Labs and/or images reviewed:  Labs reviewed by me, Image(s) reviewed by me





Assessment/Plan


Assessment/Plan


Weakness


Jaundice


Ascites


Cirrhosis of liver


Anemia


Transaminitis





Plan:


Discussed with Dr. Moeller


Stool occult blood is pending


Continue to monitor labs


Possible plan for EGD when patient is stable


Plan discussed with:  Patient, Other (RN)





Date of Service:  Dec 23, 2024


Billing Provider:  MARGE ROJAS


Common Visit Codes:  51005-[id number] INP/OBS CARE(MOD)











MARGE ROJAS                Dec 23, 2024 12:26

## 2024-12-23 NOTE — DVHOP2
Operative Report


DATE OF OPERATION:  12/23/24 





PROCEDURE:  Upper Endoscopy with biopsy.





PREOPERATIVE INDICATION:  The patient is a 69 -year-old female  undergoing 


endoscopy for anemia and epigastric pain an underlying cirrhosis of the liver





POSTOPERATIVE DIAGNOSES: 





1. She had 1+ distal esophageal varices without any stigmata of recent bleeding 





2. She had a 2 cm sliding-type hiatal hernia with grade B erosive esophagitis 

and GE junction ulceration





3. Mild-to-moderate portal hypertension gastropathy otherwise normal examination

up to the 2nd and 3rd part of the duodenal with no active bleeding no fresh or 

old blood in the GI tract 








PROCEDURE PERFORMED BY:  Pal Moeller 





GI NURSE:  Sherine





SCOPE:  Olympus videoendoscope.  





ASA CLASS:  3. 





PREOPERATIVE MEDICATIONS:  Versed 1 mg,  Fentanyl 25 mcg, Benadryl 25 mg





I administered moderate sedation throughout this _7_ minutes procedure. An 

independent trained observer pushed medications at my direction, and monitored 

the patient's level of consciousness and physiological status throughout.





PROCEDURE IN DETAIL:  After obtaining an informed consent, the patient was 


placed on left lateral decubitus position.  The patient was then sedated with 

the above 


medications.  A bite block was placed between her teeth.  The endoscope was 


then passed through the oropharynx, into the esophagus, and through the stomach 


and pylorus up to the second and third part of the duodenum. The endoscope was 

then withdrawn.  


The 2nd and 3rd part of the duodenal and the duodenal bulb were normal.  There 

was good bile drainage


Duodenal biopsies were obtained.  The pre-pyloric area antrum and body of the 

stomach showed mild-to-moderate portal hypertension gastropathy 


On retroflexion the patient also had gastropathy of the proximal stomach.  

Gastric biopsies were obtained.  Increase oozing was noted from biopsy sites


There was no gastric varix.  The endoscope was then withdrawn into the distal 

esophagus where she had a 2 cm sliding-type hiatal hernia 


with grade B erosive esophagitis with superficial ulceration at the GE junction.

 Patient had 1+ distal esophageal varices without stigmata of recent bleeding


The remaining mid to proximal esophagus and oropharynx were unremarkable


The patient tolerated the procedure well without difficulty.





COMPLICATIONS :  None





SPECIMENS:  


Duodenal biopsies 


Gastric biopsies





DISPOSITION:  


Transfer back to the floor


Stable





PLAN: 


1. Await for biopsy result; monitor labs


2. Will place pt on Protonix 40 mg bid


3. Carafate suspension 1 g p.o. 4 times a day


4. Resume full liquid diet advance to soft if tolerated 


5. I will start this patient on ursodiol 300 mg p.o. twice a day for her 

jaundice 


6. I will also start the patient on see prednisone 40 mg p.o. daily as I believe

the patient has underlying autoimmune hepatitis and I will slowly taper it over 

the next few days











PAL MOELLER MD                Dec 23, 2024 16:04

## 2024-12-23 NOTE — DVHINCON2
Date of service:  Dec 22, 2024


Referring Physician


Dr Vásquez





Reason for Consultation


Liver cirrhosis and anemia





History of Present Illness


This is a 69-year-old female with past medical history of liver cirrhosis, 

status post banding, anemia, scleroderma presented to the ED with a chief 

complaint of generalized weakness, intermittent blood in stool and urine and 

also yellowish coloration of the skin and sclera for last 1 month prior to this 

admission.  The patient was complaining of difficulty in swallowing, generalized

weakness about to pass out and was not eating anything for last 2 days.  The 

patient also complaining of intermittent blood in his stool and urine, diffuse 

abdominal pain for last 1 month. She was diagnosed with  liver cirrhosis 3 years

ago due to fatty liver and last banding for variceal bleeding was done 10 months

ago.  I had put endoscopy for her in 2021 for melena and coffee-ground emesis.  

I had diagnosed her with trace esophageal varices at that time and suspected 

liver disease.  She had few episodes of blood transfusion for symptomatic anemia

and last transfusion was 1 year ago. She never had any paracentesis before.  The

patient denies fever, chills, chest pain, dizziness, diaphoresis, nausea, 

vomiting, constipation, sick contact or any recent traveling.





Past Medical History


Past Medical History


 Liver cirrhosis, anemia, scleroderma





Past Surgical History


Past Surgical History


Appendectomy, endoscopic variceal banding





Family History:  


Patient reports no known family medical history.





Allergies:  


Coded Allergies:  


     NO KNOWN ALLERGIES (Unverified , 8/10/21)


Home Meds


No Active Prescriptions or Reported Meds


Current Medications





                               Current Medications








 Medications


  (Trade)  Dose


 Ordered  Sig/Kiran


 Route


 PRN Reason  Start Time


 Stop Time Status Last Admin


 


 Pantoprazole


 Sodium


  (Protonix)  40 mg  BID


 IV


   12/23/24 10:00


     





 


 Ceftriaxone Sodium  50 ml @ 


 100 mls/hr  DAILY


 IV


   12/22/24 22:00


     





 


 Sodium Chloride  1,000 ml @ 


 100 mls/hr  Q10H


 IV


   12/22/24 22:00


 12/22/24 22:42 DC  





 


 Propranolol HCl


  (Inderal Tablet)  10 mg  DAILY


 PO


   12/23/24 10:00


     





 


 Furosemide


  (Lasix Tablet)  40 mg  DAILY


 PO


   12/23/24 10:00


     





 


 Spironolactone


  (Aldactone)  100 mg  DAILY


 PO


   12/23/24 10:00


     














Review of Systems


Operative Report


DATE OF OPERATION:  8/11/21 





PROCEDURE:  Upper Endoscopy with biopsy





PREOPERATIVE INDICATION:  The patient is a 66 -year-old female  undergoing 


endoscopy for anemia, history of melena and coffee-ground emesis





POSTOPERATIVE DIAGNOSES:





1.  Patient had mild to moderate gastroduodenitis with superficial erosions but 

no active bleeding





2.  2 cm sliding-type hiatal hernia with slightly irregular squamocolumnar 

junction minimal grade erosive esophagitis





3.  Trace prominence of the distal esophageal veins; recommend work-up for 

possible underlying liver disease





4.  Otherwise normal examination up to the second and third part of the duodenum








PROCEDURE PERFORMED BY:  Pal Man





Vital Signs





                                   Vital Signs








  Date Time  Temp Pulse Resp B/P (MAP) Pulse Ox O2 Delivery O2 Flow Rate FiO2


 


12/22/24 14:22 98.0 71 18 134/83 (100) 94   








Physical Exam


General Appearance:  Alert, Oriented X3, Cooperative, weak


HEENT:  PERRLA, EOMI, mild pallor and scleral icterus


Lungs clear; CVS S1S2 rrr


Abdominal:  Abdomen is soft distended, flanks are full, Normal bowel sounds, 

Soft, No tenderness.


Extremities: Bilateral pedal edema +, No clubbing, No cyanosis, Normal pulses, 

No tenderness/swelling


Neuro:  Alert oriented x3 with no focal deficit





Labs/Diagnostic Data





                                      Labs








Test


 12/22/24


22:38 12/22/24


16:19 12/22/24


14:43 Range/Units


 


 


Prothrombin Time  10.9   9.3-11.8  sec


 


Prothrombin Time INR  1.03   0.9-1.15  


 


White Blood Count


 


 


 3.3 L


 4.4-10.8


10^3/uL


 


Red Blood Count


 


 


 2.06 L


 4.0-5.20


10^6/uL


 


Hemoglobin   7.6 L 12.2-16.2  g/dL


 


Hematocrit   24.6 L 36.0-46.0  %


 


Mean Corpuscular Volume   119.3 H 80.0-100.0  fL


 


Mean Corpuscular Hemoglobin   36.8 H 28.0-32.0  pg


 


Mean Corpuscular Hemoglobin


Concent 


 


 30.8 L


 32.0-36.0  g/dL





 


Red Cell Distribution Width   16.6 H 11.8-14.3  %


 


Platelet Count


 


 


 142 


 140-450


10^3/uL


 


Mean Platelet Volume   8.3  6.9-10.8  fL


 


Neutrophils (%) (Auto)   73.1  37.0-80.0  %


 


Lymphocytes (%) (Auto)   12.9  10.0-50.0  %


 


Monocytes (%) (Auto)   8.9  0.0-12.0  %


 


Eosinophils (%) (Auto)   3.8  0.0-7.0  %


 


Basophils (%) (Auto)   1.3  0.0-2.0  %


 


Neutrophils # (Auto)


 


 


 2.4 


 1.6-8.6  10


^3/uL


 


Lymphocytes # (Auto)


 


 


 0.4 


 0.4-5.4  10


^3/uL


 


Monocytes # (Auto)   0.3  0-1.3  10 ^3/uL


 


Eosinophils # (Auto)   0.1  0-0.8  10 ^3/uL


 


Basophils # (Auto)   0  0-0.2  10 ^3/uL


 


Nucleated Red Blood Cells   0.5   %


 


Sodium Level   139  136-145  mmol/L


 


Potassium Level   4.3  3.5-5.1  mmol/L


 


Chloride Level   110 H   mmol/L


 


Carbon Dioxide Level   19 L 20-31  mmol/L


 


Anion Gap   10  5-15  


 


Blood Urea Nitrogen   15  9-23  mg/dL


 


Creatinine


 


 


 1.18 H


 0.550-1.02


mg/dL


 


Glomerular Filtration Rate


Calc 


 


 51 


 >90  mL/min





 


BUN/Creatinine Ratio   12.7  10.0-20.0  


 


Serum Glucose   128 H   mg/dL


 


Hemoglobin A1c   < 5.7  <5.7  % A1C


 


Calcium Level   9.5  8.7-10.4  mg/dL


 


Total Bilirubin   4.8 H 0.2-1.0  mg/dL


 


Aspartate Amino Transferase


(AST) 


 


 69 H


 13-40  U/L





 


Alanine Aminotransferase (ALT)   33  7-40  U/L


 


Alkaline Phosphatase   231 H   U/L


 


B-Type Natriuretic Peptide   311.63  0-100  pg/mL


 


Total Protein   6.4  5.7-8.2  g/dL


 


Albumin   3.9  3.2-4.8  g/dL


 


Thyroid Stimulating Hormone


(TSH) 


 


 2.30 


 0.55-4.78


uIU/mL





Liver USG





IMPRESSION: 





1. Cirrhotic liver with small volume ascites seen throughout the abdomen.





Problems(with codes):  


(1) Weakness


(2) Jaundice


(3) Ascites


(4) Cirrhosis of liver


(5) Macrocytic anemia


(6) Symptomatic anemia


(7) Transaminitis


Plan/Recommendation


Plan





Patient's hepatitis panel had been negative in the past however her PIERO screen 

was positive suggestive of possible autoimmune hepatitis 


Patient has not been following up with me in clinic and we will review her 

outpatient records


Her B12 and serum folate levels are pending ; stool for occult blood is pending


Her MELD score is 14 points suggestive of good prognosis 


Patient has mild UTI and we will check a urine culture and give her broad-

spectrum antibiotics 


PIERO comprehensive panel, antimitochondrial antibody; serum alpha fetoprotein


Consider use of low-dose steroids if liver enzymes duodenal improve


Currently on IV antibiotics, low-dose Lasix and spironolactone and propranolol


Prognosis remains guarded; I will follow up patient with you


Plan discussed with:  Patient, Other











PAL MAN MD                Dec 23, 2024 00:14

## 2024-12-24 VITALS
DIASTOLIC BLOOD PRESSURE: 27 MMHG | SYSTOLIC BLOOD PRESSURE: 107 MMHG | RESPIRATION RATE: 17 BRPM | HEART RATE: 88 BPM | OXYGEN SATURATION: 98 % | TEMPERATURE: 98.1 F

## 2024-12-24 VITALS — RESPIRATION RATE: 18 BRPM | OXYGEN SATURATION: 94 % | HEART RATE: 71 BPM

## 2024-12-24 VITALS
DIASTOLIC BLOOD PRESSURE: 49 MMHG | SYSTOLIC BLOOD PRESSURE: 110 MMHG | RESPIRATION RATE: 18 BRPM | HEART RATE: 73 BPM | TEMPERATURE: 99.1 F

## 2024-12-24 VITALS — RESPIRATION RATE: 18 BRPM | OXYGEN SATURATION: 98 %

## 2024-12-24 VITALS
SYSTOLIC BLOOD PRESSURE: 79 MMHG | DIASTOLIC BLOOD PRESSURE: 44 MMHG | OXYGEN SATURATION: 98 % | RESPIRATION RATE: 18 BRPM | HEART RATE: 83 BPM | TEMPERATURE: 98.9 F

## 2024-12-24 VITALS
TEMPERATURE: 97.5 F | DIASTOLIC BLOOD PRESSURE: 59 MMHG | OXYGEN SATURATION: 95 % | RESPIRATION RATE: 14 BRPM | HEART RATE: 77 BPM | SYSTOLIC BLOOD PRESSURE: 108 MMHG

## 2024-12-24 VITALS
OXYGEN SATURATION: 96 % | RESPIRATION RATE: 16 BRPM | HEART RATE: 71 BPM | SYSTOLIC BLOOD PRESSURE: 103 MMHG | TEMPERATURE: 98 F | DIASTOLIC BLOOD PRESSURE: 44 MMHG

## 2024-12-24 VITALS
SYSTOLIC BLOOD PRESSURE: 100 MMHG | RESPIRATION RATE: 18 BRPM | HEART RATE: 81 BPM | DIASTOLIC BLOOD PRESSURE: 46 MMHG | TEMPERATURE: 99.2 F

## 2024-12-24 VITALS
SYSTOLIC BLOOD PRESSURE: 103 MMHG | OXYGEN SATURATION: 97 % | HEART RATE: 91 BPM | DIASTOLIC BLOOD PRESSURE: 35 MMHG | RESPIRATION RATE: 17 BRPM | TEMPERATURE: 98.4 F

## 2024-12-24 VITALS
OXYGEN SATURATION: 98 % | DIASTOLIC BLOOD PRESSURE: 43 MMHG | TEMPERATURE: 98 F | HEART RATE: 64 BPM | SYSTOLIC BLOOD PRESSURE: 107 MMHG | RESPIRATION RATE: 16 BRPM

## 2024-12-24 VITALS — RESPIRATION RATE: 16 BRPM | HEART RATE: 85 BPM | SYSTOLIC BLOOD PRESSURE: 107 MMHG | DIASTOLIC BLOOD PRESSURE: 43 MMHG

## 2024-12-24 VITALS — SYSTOLIC BLOOD PRESSURE: 94 MMHG | DIASTOLIC BLOOD PRESSURE: 40 MMHG

## 2024-12-24 VITALS — HEART RATE: 89 BPM

## 2024-12-24 LAB
ANION GAP SERPL CALCULATED.3IONS-SCNC: 9 MMOL/L (ref 5–15)
BUN SERPL-MCNC: 15 MG/DL (ref 9–23)
BUN/CREAT SERPL: 14.3 (ref 10–20)
CALCIUM SERPL-MCNC: 9.5 MG/DL (ref 8.7–10.4)
CELLS COUNTED: 100
CHLORIDE SERPL-SCNC: 111 MMOL/L (ref 98–107)
CO2 SERPL-SCNC: 20 MMOL/L (ref 20–31)
GLUCOSE SERPL-MCNC: 136 MG/DL (ref 74–106)
HCT VFR BLD AUTO: 21.6 % (ref 36–46)
HCT VFR BLD AUTO: 22.2 % (ref 36–46)
HGB BLD-MCNC: 6.8 G/DL (ref 12.2–16.2)
HGB BLD-MCNC: 7 G/DL (ref 12.2–16.2)
MCH RBC QN AUTO: 37 PG (ref 28–32)
MCV RBC AUTO: 117.2 FL (ref 80–100)
POTASSIUM SERPL-SCNC: 4.3 MMOL/L (ref 3.5–5.1)
SODIUM SERPL-SCNC: 140 MMOL/L (ref 136–145)

## 2024-12-24 PROCEDURE — 30233N1 TRANSFUSION OF NONAUTOLOGOUS RED BLOOD CELLS INTO PERIPHERAL VEIN, PERCUTANEOUS APPROACH: ICD-10-PCS

## 2024-12-24 RX ADMIN — SUCRALFATE SCH GM: 1 SUSPENSION ORAL at 11:27

## 2024-12-24 NOTE — DVHPNRES
Progress Note


Date Seen:  Dec 24, 2024


Resident Creating Document:  HAMILTON LAMAS RESIDENT


Medical Necessity Reason


Pt with a Central, PICC or Fol:  No





Subjective


Review of Systems


Patient is a 69-year-old female with past medical history of liver cirrhosis 

diagnosed 3 years ago, s/p variceal banding 10 months ago, questionable 

autoimmune hepatitis and scleroderma, who came in due to abdominal pain that has

been ongoing for the past 8 days.  Patient rates the pain as 8/10, constant and 

radiating to bilateral shoulders, she describes the pain as burning in nature.  

Per patient's daughter, she had multiple episodes of melena and hematuria.








Past surgical history:  Status post cholecystectomy


Home medications:  Spironolactone, propranolol, ursodiol, Protonix


Past Hospitalization:  For similar symptoms in 2023


Social & Personal history:  Patient lives with her daughter.  Denies using 

tobacco, alcohol, drugs.





Allergies:  Denies


 








Patient seen and examined at bedside. Patient is alert and oriented to time, 

place person and responding to all questions.  Patient notes she had 2/3 bowel 

movements in the last 24 hours, which were bloody.  Patient was initially noted 

to have an hemoglobin of 7.1, however, later in the day repeat hemoglobin was 

6.8 and she was subsequently began on 1 PRBC transfusion.





Objective


vital signs





                                   Vital Sign








  Date Time  Temp Pulse Resp B/P (MAP) Pulse Ox O2 Delivery O2 Flow Rate FiO2


 


12/24/24 17:00 98.4 91 17 103/35 (57) 97   





 98.4       


 


12/24/24 08:00      Room Air* 0 21














                           Total Intake and Output   


 


 12/23/24 12/23/24 12/24/24





 15:00 23:00 07:00


 


Intake Total  300 ml 0 ml


 


Output Total   2 ml


 


Balance  300 ml -2 ml








medications





                               Current Medications








 Medications  Dose


 Ordered  Sig/Kiran


 Route  Start Time


 Stop Time Status Last Admin


Dose Admin


 


 Pantoprazole


 Sodium  40 mg  BID


 IV  12/23/24 10:00


    12/24/24 08:26


40 MG


 


 Ceftriaxone Sodium  50 ml @ 


 100 mls/hr  DAILY


 IV  12/22/24 22:00


    12/23/24 22:00


100 MLS/HR


 


 Propranolol HCl  10 mg  DAILY


 PO  12/23/24 10:00


     





 


 Furosemide  40 mg  DAILY


 PO  12/23/24 10:00


     





 


 Spironolactone  100 mg  DAILY


 PO  12/23/24 10:00


     





 


 Ursodiol  300 mg  BID


 PO  12/23/24 22:00


    12/24/24 08:26


300 MG


 


 Sucralfate  1 gm  QID@0600,1130,1700,2200


 PO  12/24/24 11:30


    12/24/24 11:27


1 GM


 


 Prednisone  30 mg  DAILY


 PO  12/25/24 10:00


     











Examination


General Appearance:  Cooperative. Well developed. Well nourished.  NAD


Head Exam:  Normal inspection


Neck Exam:  Normal inspection. Non-tender. Normal alignment


Pulmonary/Respiratory:  Chest non-tender. Clear bilateral breath sounds, no 

crackles, no wheezing.


Cardiovascular/Chest: Regular rate and rhythm.  No murmurs.  No JVD.


Peripheral Pulses:  2+ Radial (R). 2+ Radial (L). 2+ Pedal (R). 2+ Pedal (L)


Abdominal Exam:  Normal bowel sounds. Soft.  normal abdomen, no visible veins, 

generalized abdominal tenderness, more concentrated in the midepigastric and 

bilateral flank region, shifting dullness noted  No hepatospenomegaly.  No 

masses


Ankle Exam: Negative ankle edema


Lower extremities: Negative lower extremity edema


Neuro/Mental Status: A&O x4.  Coherent.


Thoughts/Psych:  Normal thought pattern.  Appropriate mood and affect.  Good 

judgement and insight


Skin Exam:  Normal inspection. Normal color. Warm. Dry


laboratory and microbiology


                                Laboratory Tests


12/24/24 14:13








12/24/24 06:49

















Test


 12/24/24


06:49 Range/Units


 


 


Serum Glucose 136 H   mg/dL








Labs and/or images reviewed:  Labs reviewed by me, Image(s) reviewed by me





Problem List/Assessment/Plan


Problem List/Assessment/Plan


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  Meld-Na 14 points, child class B (8

points)


Hyperbilirubinemia due to above


Variceal bleeding less likely


Small volume ascites


anemia requiring blood transfusion


- EGD:  1+ distal esophageal varices without any stigmata of recent bleeding.  2

cm sliding-type hiatal hernia with grade B erosive esophagitis and GE junction 

ulceration.  Mild-to-moderate portal hypertension gastropathy.


- abdominal ultrasound:  Cirrhotic liver with small volume ascites seen 

throughout the abdomen 


- IV ceftriaxone


- furosemide 40 mg p.o. daily, spironolactone 100 mg p.o. daily


- propranolol 10 mg p.o. daily, IV Protonix 40 mg b.i.d.


- 1 PRBC transfusion


- ursodiol





Erosive esophagitis 


- Protonix 


- one drug Carafate suspension q.i.d.





Leukopenia, decreased absolute neutrophil count 


- we will continue to monitor





ANA, likely hemodynamically mediated/VMN on questionable CKD?


- monitor





History of autoimmune hepatitis


- ordered serum PIERO comprehensive panel, AFP








Goals of care:  Full code, discussed for >16 minutes on 12/23/24


Plan discussed with patient


Plan discussed with Dr. Smiley


Plan discussed with:  Patient, Daughter, Other (RN)





My Orders


My Orders





                           Orders - HAMILTON LAMAS RESIDENT








Procedure Category Date Status





  Time 


 


Sucralfate Susp PHA 12/24/24 In Process





 (Carafate Susp)  11:30 


 


Precautions: MOHINI 12/24/24 In Process





Neutropenic  10:58 


 


Hemoglobin & LAB 12/24/24 Logged





Hematocrit  21:00 











Date of Service:  Dec 24, 2024


Billing Provider:  SAMANTHA SMILEY MD


Common Visit Codes:  [id number] INP/OBS CARE(HIGH)











HAMILTON LAMAS             Dec 24, 2024 17:21


SAMANTHA SMILEY MD               Dec 26, 2024 09:02

## 2024-12-24 NOTE — DVHPN2
Progress Note - Dictate


Date Seen:  Dec 24, 2024


Medical Necessity Reason


Pt with a Central, PICC or Fol:  No


Subjective


No new complaints ; tolerating diet


Patient is stable and feels better


She is noted to have pancytopenia


EGD showed small hiatal hernia with esophagitis, 1+ varices and moderate portal 

hypertension gastropathy with no bleeding


vital signs





                                   Vital Sign








  Date Time  Temp Pulse Resp B/P (MAP) Pulse Ox O2 Delivery O2 Flow Rate FiO2


 


12/24/24 13:34  85 16 107/43 (64)    


 


12/24/24 13:00 98.1    98   





 98.1       


 


12/24/24 08:00      Room Air* 0 21














                           Total Intake and Output   


 


 12/23/24 12/23/24 12/24/24





 15:00 23:00 07:00


 


Intake Total  300 ml 0 ml


 


Output Total   2 ml


 


Balance  300 ml -2 ml








medications





                               Current Medications








 Medications  Dose


 Ordered  Sig/Kiran


 Route  Start Time


 Stop Time Status Last Admin


Dose Admin


 


 Pantoprazole


 Sodium  40 mg  BID


 IV  12/23/24 10:00


    12/24/24 08:26


40 MG


 


 Ceftriaxone Sodium  50 ml @ 


 100 mls/hr  DAILY


 IV  12/22/24 22:00


    12/23/24 22:00


100 MLS/HR


 


 Propranolol HCl  10 mg  DAILY


 PO  12/23/24 10:00


     





 


 Furosemide  40 mg  DAILY


 PO  12/23/24 10:00


     





 


 Spironolactone  100 mg  DAILY


 PO  12/23/24 10:00


     





 


 Ursodiol  300 mg  BID


 PO  12/23/24 22:00


    12/24/24 08:26


300 MG


 


 Sucralfate  1 gm  QID@0600,1130,1700,2200


 PO  12/24/24 11:30


    12/24/24 11:27


1 GM








objective


eneral Appearance:  Alert, Oriented X3, Cooperative, weak


HEENT:  PERRLA, EOMI, mild pallor and scleral icterus


Lungs clear; CVS S1S2 rrr


Abdominal:  Abdomen is soft distended, flanks are full, Normal bowel sounds, 

Soft, No tenderness.


Extremities: Bilateral pedal edema +, No clubbing, No cyanosis, Normal pulses, 

No tenderness/swelling


Neuro:  Alert oriented x3 with no focal deficit


laboratory and microbiology


                                Laboratory Tests


12/24/24 14:13








12/24/24 06:49

















Test


 12/24/24


06:49 Range/Units


 


 


Serum Glucose 136 H   mg/dL








Problems(with codes):  


(1) Weakness


(2) Cirrhosis of liver


(3) Jaundice


(4) Ascites


(5) Macrocytic anemia


(6) Symptomatic anemia


(7) Transaminitis


Prognosis


Plan





Patient has poor memory and does not recall events 


We will check a repeat serum ammonia level 


Patient is usually followed at [facility] and had has a 

follow up appointment with them in January


I have started her on steroids as the patient appears to have likely underlying 

autoimmune hepatitis


I also started on ursodiol 300 mg p.o. twice a day


Patient will be given 1 unit PRBC today


Check labs tomorrow


Prognosis remains guarded


Plan discussed with:  Patient











PAL MAN MD                Dec 24, 2024 14:55

## 2024-12-25 VITALS — DIASTOLIC BLOOD PRESSURE: 37 MMHG | SYSTOLIC BLOOD PRESSURE: 97 MMHG | HEART RATE: 61 BPM

## 2024-12-25 VITALS
RESPIRATION RATE: 22 BRPM | SYSTOLIC BLOOD PRESSURE: 114 MMHG | OXYGEN SATURATION: 96 % | DIASTOLIC BLOOD PRESSURE: 45 MMHG | HEART RATE: 69 BPM

## 2024-12-25 VITALS
DIASTOLIC BLOOD PRESSURE: 48 MMHG | SYSTOLIC BLOOD PRESSURE: 120 MMHG | HEART RATE: 66 BPM | RESPIRATION RATE: 17 BRPM | OXYGEN SATURATION: 96 %

## 2024-12-25 VITALS
DIASTOLIC BLOOD PRESSURE: 37 MMHG | RESPIRATION RATE: 20 BRPM | TEMPERATURE: 98 F | HEART RATE: 60 BPM | SYSTOLIC BLOOD PRESSURE: 97 MMHG | OXYGEN SATURATION: 96 %

## 2024-12-25 VITALS
RESPIRATION RATE: 14 BRPM | HEART RATE: 50 BPM | DIASTOLIC BLOOD PRESSURE: 44 MMHG | SYSTOLIC BLOOD PRESSURE: 133 MMHG | OXYGEN SATURATION: 97 %

## 2024-12-25 VITALS
SYSTOLIC BLOOD PRESSURE: 97 MMHG | RESPIRATION RATE: 18 BRPM | HEART RATE: 57 BPM | OXYGEN SATURATION: 92 % | DIASTOLIC BLOOD PRESSURE: 43 MMHG

## 2024-12-25 VITALS
HEART RATE: 50 BPM | SYSTOLIC BLOOD PRESSURE: 126 MMHG | RESPIRATION RATE: 21 BRPM | OXYGEN SATURATION: 95 % | DIASTOLIC BLOOD PRESSURE: 53 MMHG

## 2024-12-25 VITALS
DIASTOLIC BLOOD PRESSURE: 44 MMHG | RESPIRATION RATE: 25 BRPM | HEART RATE: 70 BPM | SYSTOLIC BLOOD PRESSURE: 128 MMHG | OXYGEN SATURATION: 96 %

## 2024-12-25 VITALS
DIASTOLIC BLOOD PRESSURE: 44 MMHG | SYSTOLIC BLOOD PRESSURE: 110 MMHG | HEART RATE: 69 BPM | RESPIRATION RATE: 19 BRPM | OXYGEN SATURATION: 93 %

## 2024-12-25 VITALS
SYSTOLIC BLOOD PRESSURE: 123 MMHG | OXYGEN SATURATION: 98 % | DIASTOLIC BLOOD PRESSURE: 60 MMHG | HEART RATE: 63 BPM | RESPIRATION RATE: 16 BRPM

## 2024-12-25 VITALS
DIASTOLIC BLOOD PRESSURE: 39 MMHG | OXYGEN SATURATION: 95 % | SYSTOLIC BLOOD PRESSURE: 121 MMHG | HEART RATE: 64 BPM | RESPIRATION RATE: 22 BRPM

## 2024-12-25 VITALS
HEART RATE: 72 BPM | OXYGEN SATURATION: 95 % | RESPIRATION RATE: 21 BRPM | SYSTOLIC BLOOD PRESSURE: 110 MMHG | DIASTOLIC BLOOD PRESSURE: 39 MMHG

## 2024-12-25 VITALS
SYSTOLIC BLOOD PRESSURE: 124 MMHG | DIASTOLIC BLOOD PRESSURE: 49 MMHG | RESPIRATION RATE: 20 BRPM | OXYGEN SATURATION: 96 % | HEART RATE: 59 BPM

## 2024-12-25 VITALS
HEART RATE: 53 BPM | SYSTOLIC BLOOD PRESSURE: 117 MMHG | DIASTOLIC BLOOD PRESSURE: 47 MMHG | OXYGEN SATURATION: 95 % | RESPIRATION RATE: 23 BRPM

## 2024-12-25 VITALS
HEART RATE: 57 BPM | SYSTOLIC BLOOD PRESSURE: 105 MMHG | OXYGEN SATURATION: 89 % | RESPIRATION RATE: 17 BRPM | DIASTOLIC BLOOD PRESSURE: 42 MMHG

## 2024-12-25 VITALS
HEART RATE: 67 BPM | DIASTOLIC BLOOD PRESSURE: 34 MMHG | OXYGEN SATURATION: 94 % | RESPIRATION RATE: 21 BRPM | SYSTOLIC BLOOD PRESSURE: 97 MMHG

## 2024-12-25 VITALS
DIASTOLIC BLOOD PRESSURE: 58 MMHG | HEART RATE: 63 BPM | RESPIRATION RATE: 15 BRPM | SYSTOLIC BLOOD PRESSURE: 116 MMHG | OXYGEN SATURATION: 96 %

## 2024-12-25 VITALS — DIASTOLIC BLOOD PRESSURE: 39 MMHG | SYSTOLIC BLOOD PRESSURE: 84 MMHG

## 2024-12-25 VITALS
RESPIRATION RATE: 21 BRPM | HEART RATE: 62 BPM | DIASTOLIC BLOOD PRESSURE: 47 MMHG | OXYGEN SATURATION: 94 % | SYSTOLIC BLOOD PRESSURE: 107 MMHG

## 2024-12-25 VITALS
SYSTOLIC BLOOD PRESSURE: 102 MMHG | OXYGEN SATURATION: 97 % | RESPIRATION RATE: 20 BRPM | DIASTOLIC BLOOD PRESSURE: 45 MMHG | HEART RATE: 61 BPM

## 2024-12-25 VITALS
OXYGEN SATURATION: 96 % | RESPIRATION RATE: 19 BRPM | SYSTOLIC BLOOD PRESSURE: 134 MMHG | HEART RATE: 54 BPM | DIASTOLIC BLOOD PRESSURE: 51 MMHG

## 2024-12-25 VITALS
RESPIRATION RATE: 19 BRPM | OXYGEN SATURATION: 96 % | SYSTOLIC BLOOD PRESSURE: 119 MMHG | DIASTOLIC BLOOD PRESSURE: 47 MMHG | HEART RATE: 68 BPM

## 2024-12-25 VITALS
OXYGEN SATURATION: 90 % | RESPIRATION RATE: 18 BRPM | DIASTOLIC BLOOD PRESSURE: 46 MMHG | HEART RATE: 60 BPM | SYSTOLIC BLOOD PRESSURE: 101 MMHG

## 2024-12-25 VITALS
SYSTOLIC BLOOD PRESSURE: 112 MMHG | RESPIRATION RATE: 16 BRPM | TEMPERATURE: 98.4 F | OXYGEN SATURATION: 95 % | DIASTOLIC BLOOD PRESSURE: 43 MMHG | HEART RATE: 62 BPM

## 2024-12-25 VITALS
SYSTOLIC BLOOD PRESSURE: 113 MMHG | HEART RATE: 66 BPM | DIASTOLIC BLOOD PRESSURE: 50 MMHG | RESPIRATION RATE: 20 BRPM | OXYGEN SATURATION: 95 %

## 2024-12-25 VITALS
DIASTOLIC BLOOD PRESSURE: 58 MMHG | HEART RATE: 52 BPM | RESPIRATION RATE: 22 BRPM | SYSTOLIC BLOOD PRESSURE: 145 MMHG | OXYGEN SATURATION: 98 %

## 2024-12-25 VITALS
HEART RATE: 55 BPM | DIASTOLIC BLOOD PRESSURE: 49 MMHG | SYSTOLIC BLOOD PRESSURE: 126 MMHG | OXYGEN SATURATION: 95 % | RESPIRATION RATE: 17 BRPM

## 2024-12-25 VITALS
OXYGEN SATURATION: 95 % | SYSTOLIC BLOOD PRESSURE: 112 MMHG | HEART RATE: 56 BPM | DIASTOLIC BLOOD PRESSURE: 40 MMHG | RESPIRATION RATE: 22 BRPM

## 2024-12-25 VITALS
RESPIRATION RATE: 18 BRPM | HEART RATE: 54 BPM | OXYGEN SATURATION: 93 % | SYSTOLIC BLOOD PRESSURE: 110 MMHG | DIASTOLIC BLOOD PRESSURE: 45 MMHG

## 2024-12-25 VITALS
RESPIRATION RATE: 20 BRPM | SYSTOLIC BLOOD PRESSURE: 118 MMHG | DIASTOLIC BLOOD PRESSURE: 51 MMHG | HEART RATE: 71 BPM | OXYGEN SATURATION: 95 %

## 2024-12-25 VITALS
SYSTOLIC BLOOD PRESSURE: 119 MMHG | RESPIRATION RATE: 22 BRPM | HEART RATE: 58 BPM | DIASTOLIC BLOOD PRESSURE: 46 MMHG | OXYGEN SATURATION: 96 %

## 2024-12-25 VITALS
RESPIRATION RATE: 24 BRPM | DIASTOLIC BLOOD PRESSURE: 42 MMHG | OXYGEN SATURATION: 95 % | SYSTOLIC BLOOD PRESSURE: 107 MMHG | HEART RATE: 61 BPM

## 2024-12-25 VITALS
RESPIRATION RATE: 20 BRPM | SYSTOLIC BLOOD PRESSURE: 124 MMHG | DIASTOLIC BLOOD PRESSURE: 52 MMHG | HEART RATE: 57 BPM | OXYGEN SATURATION: 97 %

## 2024-12-25 VITALS
HEART RATE: 84 BPM | DIASTOLIC BLOOD PRESSURE: 74 MMHG | SYSTOLIC BLOOD PRESSURE: 98 MMHG | OXYGEN SATURATION: 100 % | RESPIRATION RATE: 21 BRPM

## 2024-12-25 VITALS
SYSTOLIC BLOOD PRESSURE: 92 MMHG | HEART RATE: 77 BPM | OXYGEN SATURATION: 96 % | RESPIRATION RATE: 18 BRPM | TEMPERATURE: 97.7 F | DIASTOLIC BLOOD PRESSURE: 35 MMHG

## 2024-12-25 VITALS
HEART RATE: 58 BPM | TEMPERATURE: 98.6 F | RESPIRATION RATE: 17 BRPM | SYSTOLIC BLOOD PRESSURE: 124 MMHG | OXYGEN SATURATION: 96 % | DIASTOLIC BLOOD PRESSURE: 48 MMHG

## 2024-12-25 VITALS
HEART RATE: 60 BPM | RESPIRATION RATE: 15 BRPM | OXYGEN SATURATION: 95 % | DIASTOLIC BLOOD PRESSURE: 44 MMHG | SYSTOLIC BLOOD PRESSURE: 117 MMHG

## 2024-12-25 VITALS
RESPIRATION RATE: 18 BRPM | HEART RATE: 61 BPM | OXYGEN SATURATION: 96 % | DIASTOLIC BLOOD PRESSURE: 41 MMHG | SYSTOLIC BLOOD PRESSURE: 112 MMHG

## 2024-12-25 VITALS
DIASTOLIC BLOOD PRESSURE: 51 MMHG | SYSTOLIC BLOOD PRESSURE: 117 MMHG | OXYGEN SATURATION: 94 % | RESPIRATION RATE: 15 BRPM | HEART RATE: 56 BPM

## 2024-12-25 VITALS
RESPIRATION RATE: 16 BRPM | DIASTOLIC BLOOD PRESSURE: 46 MMHG | TEMPERATURE: 98.4 F | HEART RATE: 64 BPM | SYSTOLIC BLOOD PRESSURE: 105 MMHG | OXYGEN SATURATION: 94 %

## 2024-12-25 VITALS
DIASTOLIC BLOOD PRESSURE: 41 MMHG | OXYGEN SATURATION: 94 % | SYSTOLIC BLOOD PRESSURE: 104 MMHG | HEART RATE: 60 BPM | RESPIRATION RATE: 20 BRPM

## 2024-12-25 VITALS
RESPIRATION RATE: 20 BRPM | SYSTOLIC BLOOD PRESSURE: 111 MMHG | OXYGEN SATURATION: 97 % | DIASTOLIC BLOOD PRESSURE: 48 MMHG | HEART RATE: 68 BPM

## 2024-12-25 VITALS
HEART RATE: 56 BPM | OXYGEN SATURATION: 94 % | RESPIRATION RATE: 19 BRPM | DIASTOLIC BLOOD PRESSURE: 48 MMHG | SYSTOLIC BLOOD PRESSURE: 123 MMHG

## 2024-12-25 VITALS — SYSTOLIC BLOOD PRESSURE: 112 MMHG | DIASTOLIC BLOOD PRESSURE: 50 MMHG | HEART RATE: 59 BPM | RESPIRATION RATE: 22 BRPM

## 2024-12-25 VITALS
DIASTOLIC BLOOD PRESSURE: 49 MMHG | HEART RATE: 56 BPM | SYSTOLIC BLOOD PRESSURE: 126 MMHG | OXYGEN SATURATION: 97 % | RESPIRATION RATE: 20 BRPM

## 2024-12-25 VITALS — SYSTOLIC BLOOD PRESSURE: 85 MMHG | DIASTOLIC BLOOD PRESSURE: 28 MMHG | HEART RATE: 71 BPM

## 2024-12-25 VITALS
DIASTOLIC BLOOD PRESSURE: 49 MMHG | SYSTOLIC BLOOD PRESSURE: 104 MMHG | HEART RATE: 60 BPM | OXYGEN SATURATION: 95 % | RESPIRATION RATE: 18 BRPM

## 2024-12-25 VITALS
OXYGEN SATURATION: 92 % | SYSTOLIC BLOOD PRESSURE: 97 MMHG | RESPIRATION RATE: 23 BRPM | DIASTOLIC BLOOD PRESSURE: 43 MMHG | HEART RATE: 62 BPM

## 2024-12-25 VITALS
SYSTOLIC BLOOD PRESSURE: 103 MMHG | RESPIRATION RATE: 16 BRPM | HEART RATE: 70 BPM | DIASTOLIC BLOOD PRESSURE: 42 MMHG | OXYGEN SATURATION: 94 %

## 2024-12-25 VITALS
OXYGEN SATURATION: 95 % | RESPIRATION RATE: 16 BRPM | SYSTOLIC BLOOD PRESSURE: 122 MMHG | DIASTOLIC BLOOD PRESSURE: 45 MMHG | HEART RATE: 51 BPM

## 2024-12-25 VITALS
OXYGEN SATURATION: 98 % | HEART RATE: 68 BPM | RESPIRATION RATE: 21 BRPM | SYSTOLIC BLOOD PRESSURE: 108 MMHG | DIASTOLIC BLOOD PRESSURE: 48 MMHG | TEMPERATURE: 98.1 F

## 2024-12-25 VITALS
OXYGEN SATURATION: 96 % | RESPIRATION RATE: 20 BRPM | HEART RATE: 61 BPM | DIASTOLIC BLOOD PRESSURE: 46 MMHG | SYSTOLIC BLOOD PRESSURE: 112 MMHG

## 2024-12-25 VITALS
HEART RATE: 59 BPM | DIASTOLIC BLOOD PRESSURE: 45 MMHG | RESPIRATION RATE: 20 BRPM | SYSTOLIC BLOOD PRESSURE: 104 MMHG | OXYGEN SATURATION: 95 %

## 2024-12-25 VITALS
RESPIRATION RATE: 18 BRPM | OXYGEN SATURATION: 96 % | DIASTOLIC BLOOD PRESSURE: 43 MMHG | HEART RATE: 59 BPM | SYSTOLIC BLOOD PRESSURE: 116 MMHG

## 2024-12-25 VITALS
TEMPERATURE: 98.1 F | HEART RATE: 62 BPM | SYSTOLIC BLOOD PRESSURE: 122 MMHG | OXYGEN SATURATION: 97 % | DIASTOLIC BLOOD PRESSURE: 46 MMHG | RESPIRATION RATE: 19 BRPM

## 2024-12-25 VITALS
DIASTOLIC BLOOD PRESSURE: 49 MMHG | SYSTOLIC BLOOD PRESSURE: 108 MMHG | OXYGEN SATURATION: 96 % | RESPIRATION RATE: 15 BRPM | HEART RATE: 64 BPM

## 2024-12-25 VITALS
SYSTOLIC BLOOD PRESSURE: 102 MMHG | OXYGEN SATURATION: 95 % | DIASTOLIC BLOOD PRESSURE: 40 MMHG | HEART RATE: 73 BPM | RESPIRATION RATE: 19 BRPM

## 2024-12-25 VITALS — SYSTOLIC BLOOD PRESSURE: 93 MMHG | HEART RATE: 64 BPM | DIASTOLIC BLOOD PRESSURE: 33 MMHG

## 2024-12-25 VITALS — HEART RATE: 75 BPM | DIASTOLIC BLOOD PRESSURE: 35 MMHG | SYSTOLIC BLOOD PRESSURE: 98 MMHG

## 2024-12-25 VITALS
SYSTOLIC BLOOD PRESSURE: 110 MMHG | HEART RATE: 58 BPM | RESPIRATION RATE: 20 BRPM | OXYGEN SATURATION: 95 % | DIASTOLIC BLOOD PRESSURE: 43 MMHG

## 2024-12-25 VITALS — DIASTOLIC BLOOD PRESSURE: 39 MMHG | HEART RATE: 7 BPM | SYSTOLIC BLOOD PRESSURE: 84 MMHG

## 2024-12-25 VITALS
RESPIRATION RATE: 18 BRPM | HEART RATE: 63 BPM | SYSTOLIC BLOOD PRESSURE: 123 MMHG | OXYGEN SATURATION: 96 % | DIASTOLIC BLOOD PRESSURE: 42 MMHG

## 2024-12-25 VITALS
OXYGEN SATURATION: 96 % | SYSTOLIC BLOOD PRESSURE: 116 MMHG | DIASTOLIC BLOOD PRESSURE: 44 MMHG | RESPIRATION RATE: 20 BRPM | HEART RATE: 67 BPM

## 2024-12-25 VITALS
DIASTOLIC BLOOD PRESSURE: 46 MMHG | OXYGEN SATURATION: 96 % | RESPIRATION RATE: 18 BRPM | HEART RATE: 59 BPM | SYSTOLIC BLOOD PRESSURE: 112 MMHG

## 2024-12-25 VITALS
OXYGEN SATURATION: 100 % | SYSTOLIC BLOOD PRESSURE: 109 MMHG | HEART RATE: 70 BPM | RESPIRATION RATE: 23 BRPM | DIASTOLIC BLOOD PRESSURE: 47 MMHG

## 2024-12-25 VITALS
SYSTOLIC BLOOD PRESSURE: 133 MMHG | HEART RATE: 56 BPM | RESPIRATION RATE: 18 BRPM | DIASTOLIC BLOOD PRESSURE: 54 MMHG | OXYGEN SATURATION: 97 %

## 2024-12-25 VITALS
RESPIRATION RATE: 24 BRPM | SYSTOLIC BLOOD PRESSURE: 118 MMHG | OXYGEN SATURATION: 95 % | HEART RATE: 58 BPM | DIASTOLIC BLOOD PRESSURE: 45 MMHG

## 2024-12-25 VITALS
SYSTOLIC BLOOD PRESSURE: 113 MMHG | RESPIRATION RATE: 22 BRPM | HEART RATE: 61 BPM | DIASTOLIC BLOOD PRESSURE: 49 MMHG | OXYGEN SATURATION: 93 %

## 2024-12-25 VITALS
DIASTOLIC BLOOD PRESSURE: 38 MMHG | OXYGEN SATURATION: 99 % | RESPIRATION RATE: 16 BRPM | SYSTOLIC BLOOD PRESSURE: 102 MMHG | HEART RATE: 67 BPM

## 2024-12-25 VITALS
DIASTOLIC BLOOD PRESSURE: 45 MMHG | HEART RATE: 60 BPM | SYSTOLIC BLOOD PRESSURE: 114 MMHG | RESPIRATION RATE: 18 BRPM | OXYGEN SATURATION: 96 %

## 2024-12-25 VITALS — SYSTOLIC BLOOD PRESSURE: 105 MMHG | DIASTOLIC BLOOD PRESSURE: 42 MMHG | RESPIRATION RATE: 21 BRPM | HEART RATE: 58 BPM

## 2024-12-25 LAB
ALBUMIN SERPL-MCNC: 3.6 G/DL (ref 3.2–4.8)
ALP SERPL-CCNC: 188 U/L (ref 46–116)
ALT SERPL-CCNC: 27 U/L (ref 7–40)
ANION GAP SERPL CALCULATED.3IONS-SCNC: 10 MMOL/L (ref 5–15)
BILIRUB SERPL-MCNC: 4.1 MG/DL (ref 0.2–1)
BUN SERPL-MCNC: 19 MG/DL (ref 9–23)
BUN/CREAT SERPL: 16.8 (ref 10–20)
CALCIUM SERPL-MCNC: 7.9 MG/DL (ref 8.7–10.4)
CHLORIDE SERPL-SCNC: 112 MMOL/L (ref 98–107)
CO2 SERPL-SCNC: 20 MMOL/L (ref 20–31)
GLUCOSE SERPL-MCNC: 101 MG/DL (ref 74–106)
HCT VFR BLD AUTO: 25.9 % (ref 36–46)
HCT VFR BLD AUTO: 27.1 % (ref 36–46)
HGB BLD-MCNC: 8.1 G/DL (ref 12.2–16.2)
HGB BLD-MCNC: 8.8 G/DL (ref 12.2–16.2)
IRON SERPL-MCNC: 215 UG/DL (ref 50–170)
MCH RBC QN AUTO: 34.3 PG (ref 28–32)
MCV RBC AUTO: 105.3 FL (ref 80–100)
NRBC BLD QL AUTO: 0.1 %
POTASSIUM SERPL-SCNC: 3.9 MMOL/L (ref 3.5–5.1)
PROT SERPL-MCNC: 5.6 G/DL (ref 5.7–8.2)
SODIUM SERPL-SCNC: 142 MMOL/L (ref 136–145)
TIBC SERPL-MCNC: 336 UG/DL (ref 250–425)

## 2024-12-25 RX ADMIN — ALBUMIN (HUMAN) ONE MLS/HR: 0.25 INJECTION, SOLUTION INTRAVENOUS at 01:55

## 2024-12-25 RX ADMIN — MIDODRINE HYDROCHLORIDE SCH MG: 10 TABLET ORAL at 02:59

## 2024-12-25 RX ADMIN — SODIUM CHLORIDE ONE MLS/HR: 0.9 INJECTION, SOLUTION INTRAVENOUS at 02:57

## 2024-12-25 RX ADMIN — NOREPINEPHRINE BITARTRATE SCH MLS/HR: 1 INJECTION, SOLUTION, CONCENTRATE INTRAVENOUS at 08:00

## 2024-12-25 RX ADMIN — NOREPINEPHRINE BITARTRATE ONE MLS/HR: 1 INJECTION, SOLUTION, CONCENTRATE INTRAVENOUS at 04:54

## 2024-12-25 RX ADMIN — NOREPINEPHRINE BITARTRATE SCH MLS/HR: 1 INJECTION, SOLUTION, CONCENTRATE INTRAVENOUS at 05:00

## 2024-12-25 NOTE — DVHPNRES
Progress Note


Date Seen:  Dec 25, 2024


Resident Creating Document:  HAMILTON LAMAS RESIDENT


Medical Necessity Reason


Pt with a Central, PICC or Fol:  No





Subjective


Review of Systems


Patient is a 69-year-old female with past medical history of liver cirrhosis 

diagnosed 3 years ago, s/p variceal banding 10 months ago, questionable 

autoimmune hepatitis and scleroderma, who came in due to abdominal pain that has

been ongoing for the past 8 days.  Patient rates the pain as 8/10, constant and 

radiating to bilateral shoulders, she describes the pain as burning in nature.  

Per patient's daughter, she had multiple episodes of melena and hematuria.








Past surgical history:  Status post cholecystectomy


Home medications:  Spironolactone, propranolol, ursodiol, Protonix


Past Hospitalization:  For similar symptoms in 2023


Social & Personal history:  Patient lives with her daughter.  Denies using 

tobacco, alcohol, drugs.





Allergies:  Denies


 








Patient seen and examined at bedside. Patient is alert and oriented to time, 

place person and responding to all questions.  Patient notes she had 2/3 bowel 

movements in the last 24 hours.  Patient also notes chills.





Objective


vital signs





                                   Vital Sign








  Date Time  Temp Pulse Resp B/P (MAP) Pulse Ox O2 Delivery O2 Flow Rate FiO2


 


12/25/24 20:00 98.1 62 24 124/49 (74) 96   





 98.1       


 


12/25/24 18:00      Room Air* 0 21














                           Total Intake and Output   


 


 12/24/24 12/24/24 12/25/24





 15:00 23:00 07:00


 


Intake Total  50 ml 1361.25 ml


 


Balance  50 ml 1361.25 ml








medications





                               Current Medications








 Medications  Dose


 Ordered  Sig/Kiran


 Route  Start Time


 Stop Time Status Last Admin


Dose Admin


 


 Pantoprazole


 Sodium  40 mg  BID


 IV  12/23/24 10:00


    12/25/24 10:01


40 MG


 


 Ceftriaxone Sodium  50 ml @ 


 100 mls/hr  DAILY


 IV  12/22/24 22:00


    12/25/24 10:01


100 MLS/HR


 


 Furosemide  40 mg  DAILY


 PO  12/23/24 10:00


    12/25/24 10:00


40 MG


 


 Spironolactone  100 mg  DAILY


 PO  12/23/24 10:00


    12/25/24 09:59


100 MG


 


 Ursodiol  300 mg  BID


 PO  12/23/24 22:00


    12/25/24 09:59


300 MG


 


 Sucralfate  1 gm  QID@0600,1130,1700,2200


 PO  12/24/24 11:30


    12/25/24 17:04


1 GM


 


 Prednisone  30 mg  DAILY


 PO  12/25/24 10:00


    12/25/24 10:00


30 MG


 


 Midodrine  10 mg  TID@0600,1200,1800


 PO  12/25/24 03:00


    12/25/24 17:50


10 MG


 


 Norepinephrine


 Bitartrate  250 ml @ 


 3.75 mls/hr  Q24H


 IV  12/25/24 08:00


     











Examination


General Appearance:  Cooperative. Well developed. Well nourished.  NAD


Head Exam:  Normal inspection


Neck Exam:  Normal inspection. Non-tender. Normal alignment


Pulmonary/Respiratory:  Chest non-tender. Clear bilateral breath sounds, no 

crackles, no wheezing.


Cardiovascular/Chest: Regular rate and rhythm.  No murmurs.  No JVD.


Peripheral Pulses:  2+ Radial (R). 2+ Radial (L). 2+ Pedal (R). 2+ Pedal (L)


Abdominal Exam:  Normal bowel sounds. Soft.  normal abdomen, no visible veins, 

shifting dullness noted  No hepatospenomegaly.  No masses


Ankle Exam: Negative ankle edema


Lower extremities: Negative lower extremity edema


Neuro/Mental Status: A&O x4.  Coherent.


Thoughts/Psych:  Normal thought pattern.  Appropriate mood and affect.  Good 

judgement and insight


Skin Exam:  Normal inspection. Normal color. Warm. Dry


laboratory and microbiology


                                Laboratory Tests


12/25/24 06:55

















Test


 12/25/24


06:55 Range/Units


 


 


Serum Glucose 101    mg/dL








                                  Microbiology








 Date/Time


Source Procedure


Growth Status





 


 12/25/24 07:10


Nose MRSA Screen - Final


 Complete





 12/24/24 20:40


Stool Stool Culture - Preliminary


 Resulted


 


 12/24/24 20:40


Stool Shiga Toxin I & II - Final


 Resulted








Labs and/or images reviewed:  Labs reviewed by me, Image(s) reviewed by me (RN)





Problem List/Assessment/Plan


Problem List/Assessment/Plan


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  Meld-Na 14 points, child class B (8

points)


Hyperbilirubinemia due to above


Variceal bleeding less likely


Small volume ascites


anemia requiring blood transfusion


- EGD:  1+ distal esophageal varices without any stigmata of recent bleeding.  2

cm sliding-type hiatal hernia with grade B erosive esophagitis and GE junction 

ulceration.  Mild-to-moderate portal hypertension gastropathy.


- abdominal ultrasound:  Cirrhotic liver with small volume ascites seen 

throughout the abdomen 


- IV ceftriaxone


- furosemide 40 mg p.o. daily, spironolactone 100 mg p.o. daily


- propranolol 10 mg p.o. daily, IV Protonix 40 mg b.i.d.


- 1 PRBC transfusion


- ursodiol





Symptomatic hypotension, improving


- IV NS to 50 mL bolus x2 


- IV albumin 


- midodrine 10 mg t.i.d. 


- currently on norepinephrine at 2 micrograms/minute





Erosive esophagitis 


- Protonix 


- one drug Carafate suspension q.i.d.





Leukopenia, decreased absolute neutrophil count 


- we will continue to monitor





ANA, likely hemodynamically mediated/VMN on questionable CKD?


- monitor





History of autoimmune hepatitis


- ordered serum PIERO comprehensive panel, AFP








Goals of care:  Full code, discussed for >16 minutes on 12/23/24


Plan discussed with patient


Plan discussed with Dr. Smiley


Plan discussed with:  Patient, Daughter, Other





My Orders


My Orders





                           Orders - HAMILTON LAMAS








Procedure Category Date Status





  Time 


 


Insert Land Catheter [facility] 12/25/24 In Process





  15:25 











Date of Service:  Dec 25, 2024


Billing Provider:  SAMANTHA SMILEY MD


Common Visit Codes:  [id number] CARE 30-74 MIN





Coding Comment


Comment


39 minutes of critical care time spent











HAMILTON LAMAS             Dec 25, 2024 20:33


SAMANTHA SMILEY MD               Dec 26, 2024 09:03

## 2024-12-25 NOTE — DVHINCON2
Date of service:  Dec 25, 2024


Referring Physician


Leidy Saini MD





Reason for Consultation


Shock, on pressors





History of Present Illness


A 69-year-old woman with past medical history of liver cirrhosis, status post 

banding, anemia, and scleroderma who presented to the ED on 12/22/24 with c/o 

generalized weakness, intermittent blood in stool and urine and also yellowish 

coloration of the skin and sclera for last 1 month prior to this admission.  Per

daughter, patient had difficulty in swallowing, generalized weakness, feeling of

passing out, and was not eating over past 2 days prior to presentation.  She had

intermittent blood in stool and urine and diffuse abdominal pain x 1 month. Pt 

was diagnosed with  liver cirrhosis 2 years ago due to fatty liver and last 

banding for variceal bleeding was done 10 months ago.  She had few episodes of 

blood transfusion for symptomatic anemia, and last transfusion was 1 year ago. 

Pt has never had paracentesis before. Chest x-ray was unremarkable. Patient was 

admitted for further care.  Pulmonary consultation is requested for evaluation 

and management due to these findings.





Review of Systems:


14-point review of systems negative unless otherwise noted above.





Past Medical History:


Liver cirrhosis, anemia, scleroderma





Past Surgical History:


Appendectomy, variceal banding





Medications:


Reviewed.





Allergies:


No known drug allergies.





Family History:


Elder brother w/ liver cirrhosis.





Social History:


Nonsmoker. No alcohol or illicit drug use.





Family History:  


Patient reports no known family medical history.





Allergies:  


Coded Allergies:  


     NO KNOWN ALLERGIES (Unverified , 8/10/21)


Home Meds


No Active Prescriptions or Reported Meds


Current Medications





                               Current Medications








 Medications


  (Trade)  Dose


 Ordered  Sig/Kiran


 Route


 PRN Reason  Start Time


 Stop Time Status Last Admin


 


 Prednisone  30 mg  DAILY


 PO


   12/25/24 10:00


    12/25/24 10:00





 


 Midodrine


  (Proamatine


 Tablet)  10 mg  TID@0600,1200,1800


 PO


   12/25/24 03:00


    12/25/24 17:50





 


 Norepinephrine


 Bitartrate  250 ml @ 


 3.75 mls/hr  Q24H


 IV


   12/25/24 04:30


 12/25/24 07:59 DC 12/25/24 05:00





 


 Norepinephrine


 Bitartrate  250 ml @ 


 3.75 mls/hr  Q24H


 IV


   12/25/24 08:00


     














Vital Signs





                                   Vital Signs








  Date Time  Temp Pulse Resp B/P (MAP) Pulse Ox O2 Delivery O2 Flow Rate FiO2


 


12/25/24 18:30  56 15 117/51 (73) 94   


 


12/25/24 18:00 98.4       





 98.4       


 


12/25/24 18:00      Room Air* 0 21








Physical Exam


Gen.: Patient lying in bed in no apparent distress. Breathing on room air.


Head: Normocephalic, atraumatic.


Eyes: EOMI/PERRLA.


Ears: Normal hearing. Normal anatomy.


Neck/trachea: Trachea midline, supple.


Nose: Normal external anatomy.


Mouth: Moist mucous membranes.


Chest: Decreased air entry bilaterally. No wheezing or rhonchi.


Cardiovascular: Positive S1, positive S2. Regular rate and rhythm.


Abdomen: Positive bowel sounds in all 4 quadrants. Soft, non-tender, non-

distended.


: Deferred.


Rectal: Deferred.


Skin: Warm, dry. Intact.


Extremities: 2+ radial pulses bilaterally. No lower extremity edema.


Neuro: Awake, alert, oriented x3. No gross motor or sensory deficits. Cranial 

nerves II through XII intact. Gait not assessed.





Labs/Diagnostic Data





                                      Labs








Test


 12/25/24


15:50 12/25/24


06:55 12/24/24


20:40 12/24/24


06:49 Range/Units


 


 


Urine Color Light-yellow     Yellow  


 


Urine Clarity Clear     Clear  


 


Urine pH 5.0     5.0-9.0  


 


Urine Specific Gravity 1.005     1.001-1.035  


 


Urine Protein Negative     Negative  


 


Urine Ketones Negative     Negative  


 


Urine Blood Negative     Negative  /uL


 


Urine Nitrite Negative     Negative  


 


Urine Bilirubin Negative     Negative  


 


Urine Urobilinogen Normal     Negative  mg/dL


 


Urine Leukocyte Esterase Negative     Negative  /uL


 


Urine RBC 1     0 - 4  /hpf


 


Urine WBC 1     0 - 5  /hpf


 


Urine Squamous Epithelial


Cells Few 


 


 


 


 <5  /hpf





 


Urine Bacteria None seen     None Seen  /hpf


 


Urine Hyaline Casts Few     0 - 2  /lpf


 


Urine Mucus Few     None Seen  


 


Urine Glucose Normal     Normal  mg/dL


 


White Blood Count


 


 4.8 #


 


 


 4.4-10.8


10^3/uL


 


Red Blood Count


 


 2.58 L


 


 


 4.0-5.20


10^6/uL


 


Hemoglobin  8.8 L   12.2-16.2  g/dL


 


Hematocrit  27.1 L   36.0-46.0  %


 


Mean Corpuscular Volume  105.3 #H   80.0-100.0  fL


 


Mean Corpuscular Hemoglobin  34.3 H   28.0-32.0  pg


 


Mean Corpuscular Hemoglobin


Concent 


 32.6 


 


 


 32.0-36.0  g/dL





 


Red Cell Distribution Width  25.0 H   11.8-14.3  %


 


Platelet Count


 


 159 


 


 


 140-450


10^3/uL


 


Mean Platelet Volume  7.4    6.9-10.8  fL


 


Neutrophils (%) (Auto)  75.5    37.0-80.0  %


 


Lymphocytes (%) (Auto)  13.4    10.0-50.0  %


 


Monocytes (%) (Auto)  8.7    0.0-12.0  %


 


Eosinophils (%) (Auto)  1.2    0.0-7.0  %


 


Basophils (%) (Auto)  1.2    0.0-2.0  %


 


Neutrophils # (Auto)


 


 3.6 


 


 


 1.6-8.6  10


^3/uL


 


Lymphocytes # (Auto)


 


 0.6 


 


 


 0.4-5.4  10


^3/uL


 


Monocytes # (Auto)  0.4    0-1.3  10 ^3/uL


 


Eosinophils # (Auto)  0.1    0-0.8  10 ^3/uL


 


Basophils # (Auto)  0.1    0-0.2  10 ^3/uL


 


Nucleated Red Blood Cells  0.1     %


 


Sodium Level  142    136-145  mmol/L


 


Potassium Level  3.9    3.5-5.1  mmol/L


 


Chloride Level  112 H     mmol/L


 


Carbon Dioxide Level  20    20-31  mmol/L


 


Anion Gap  10    5-15  


 


Blood Urea Nitrogen  19    9-23  mg/dL


 


Creatinine


 


 1.13 H


 


 


 0.550-1.02


mg/dL


 


Glomerular Filtration Rate


Calc 


 53 


 


 


 >90  mL/min





 


BUN/Creatinine Ratio  16.8    10.0-20.0  


 


Serum Glucose  101      mg/dL


 


Calcium Level  7.9 L   8.7-10.4  mg/dL


 


Iron Level  215 H     ug/dL


 


Total Iron Binding Capacity  336    250-425  ug/dL


 


Percent Iron Saturation  64.0 H   15-50  %


 


Total Bilirubin  4.1 H   0.2-1.0  mg/dL


 


Aspartate Amino Transferase


(AST) 


 47 H


 


 


 13-40  U/L





 


Alanine Aminotransferase (ALT)  27    7-40  U/L


 


Alkaline Phosphatase  188 H     U/L


 


Ammonia  33 H   11-32  umol/L


 


Total Protein  5.6 L   5.7-8.2  g/dL


 


Albumin  3.6    3.2-4.8  g/dL


 


Stool for White Cells   None seen    


 


Differential Total Cells


Counted 


 


 


 100.0 


 100  





 


Neutrophils % (Manual)    93 H 37.0-80.0  


 


Band Neutrophils % (Manual)    1   


 


Lymphocytes % (Manual)    4 L 10.0-50.0  


 


Monocytes % (Manual)    2  0-12  


 


Eosinophils % (Manual)    0  0-7  


 


Basophils % (Manual)    0  0.0-2.0  


 


Metamyelocytes % (manual)    0   


 


Myelocytes % (Manual)    0   


 


Promyelocytes % (Manual)    0   


 


Blast Cells % (Manual)    0   


 


Reactive Lymphocytes    0   


 


Platelet Estimate    Decreased   


 


Macrocytosis    Marked   


 


Tear Drop Cells       


 


Ferritin    33.0    ng/mL


 


Test


 12/23/24


03:31 12/22/24


22:38 12/22/24


16:19 12/22/24


14:43 Range/Units


 


 


Tumor Marker Alpha Fetoprotein 2.8     0.0-9.2  ng/mL


 


Anti-Nuclear Antibody Comment Comment     .  


 


Vitamin B12 Level  1228 H   211-911  pg/mL


 


Folic Acid  13.20    >5.38  ng/mL


 


Prothrombin Time   10.9   9.3-11.8  sec


 


Prothrombin Time INR   1.03   0.9-1.15  


 


Hemoglobin A1c    < 5.7  <5.7  % A1C


 


B-Type Natriuretic Peptide    311.63  0-100  pg/mL


 


Thyroid Stimulating Hormone


(TSH) 


 


 


 2.30 


 0.55-4.78


uIU/mL


 


Hepatitis A Antibody Total    Positive H Negative  


 


Hepatitis B Surface Antigen    Negative  Negative  


 


Hepatitis B Surface Antibody    Negative  Negative  


 


Hepatitis B Core Total


Antibody 


 


 


 Negative 


 Negative  





 


Hepatitis C Antibody    Negative  Negative  








                                  Microbiology








 Date/Time


Source Procedure


Growth Status





 


 12/25/24 07:10


Nose MRSA Screen - Final


 Complete





 12/24/24 20:40


Stool Stool Culture - Preliminary


 Resulted


 


 12/24/24 20:40


Stool Shiga Toxin I & II - Final


 Resulted











Assessment


Impression:


Acute GI bleeding


History of liver cirrhosis s/p banding


History of autoimmune hepatitis


Hyperbilirubinemia with transaminitis


Ascites





Plan:


Supplemental oxygen PRN


Titrate to keep O2 sats above 92%.





Continue antibiotics


Continue steroids - prednisone


Incentive spirometry





Pressors for hemodynamic support


Titrate to keep mean arterial pressure greater than 65 mmHg.


Levophed 2 mcg/min - tapered off





On midodrine





Monitor hemoglobin





Diurese to euvolemia w/ Lasix


Monitor renal function.


Monitor electrolytes. Supplement as necessary.


Monitor ins and outs.





GI prophylaxis - Protonix


DVT prophylaxis.





Prognosis: Poor given patient's multiple co-morbidities.


Condition: Critical





Rest of plan per hospitalist and other consultants.





A total of 35 minutes of critical care time was spent reviewing the patient 

record, examining the patient, making a diagnostic and therapeutic plan, 

discussing this plan with the medical personnel, following up on diagnostic 

studies and following the patient for clinical stability excluding any and all 

procedures.  At least 50% of this time was spent in direct, face-to-face 

contact.





Thank you Dr. Leidy Saini MD, for allowing me to participate in this patient's 

care.


Further recommendations will depend on the patient's clinical course.


Please do not hesitate to contact me if you have any questions or concerns.





This medical document was created using an electronic medical record system with

Dragon computerized dictation system. Although these documentations are being 

carefully reviewed, there may still be some phonetic and typographical changes. 

The errors are purely typographical, due to imperfection on the software 

program, and do not reflect any compromise in the patient's medical care.


Plan discussed with:  Patient, Other (DARRICK Jordan/MD Saini)











TALYA DIAZ MD             Dec 25, 2024 19:04

## 2024-12-26 VITALS
HEART RATE: 53 BPM | TEMPERATURE: 97.3 F | OXYGEN SATURATION: 97 % | DIASTOLIC BLOOD PRESSURE: 43 MMHG | SYSTOLIC BLOOD PRESSURE: 136 MMHG | RESPIRATION RATE: 18 BRPM

## 2024-12-26 VITALS
SYSTOLIC BLOOD PRESSURE: 107 MMHG | HEART RATE: 53 BPM | OXYGEN SATURATION: 91 % | RESPIRATION RATE: 16 BRPM | DIASTOLIC BLOOD PRESSURE: 29 MMHG

## 2024-12-26 VITALS
DIASTOLIC BLOOD PRESSURE: 34 MMHG | OXYGEN SATURATION: 94 % | SYSTOLIC BLOOD PRESSURE: 98 MMHG | RESPIRATION RATE: 17 BRPM | HEART RATE: 60 BPM

## 2024-12-26 VITALS — RESPIRATION RATE: 20 BRPM

## 2024-12-26 VITALS
RESPIRATION RATE: 16 BRPM | HEART RATE: 58 BPM | OXYGEN SATURATION: 92 % | SYSTOLIC BLOOD PRESSURE: 99 MMHG | TEMPERATURE: 97.6 F | DIASTOLIC BLOOD PRESSURE: 41 MMHG

## 2024-12-26 VITALS
RESPIRATION RATE: 16 BRPM | OXYGEN SATURATION: 94 % | HEART RATE: 59 BPM | DIASTOLIC BLOOD PRESSURE: 49 MMHG | SYSTOLIC BLOOD PRESSURE: 116 MMHG

## 2024-12-26 VITALS
DIASTOLIC BLOOD PRESSURE: 43 MMHG | OXYGEN SATURATION: 92 % | HEART RATE: 59 BPM | SYSTOLIC BLOOD PRESSURE: 105 MMHG | RESPIRATION RATE: 16 BRPM

## 2024-12-26 VITALS
DIASTOLIC BLOOD PRESSURE: 45 MMHG | OXYGEN SATURATION: 96 % | SYSTOLIC BLOOD PRESSURE: 98 MMHG | HEART RATE: 61 BPM | RESPIRATION RATE: 15 BRPM

## 2024-12-26 VITALS
DIASTOLIC BLOOD PRESSURE: 44 MMHG | HEART RATE: 58 BPM | OXYGEN SATURATION: 93 % | RESPIRATION RATE: 17 BRPM | SYSTOLIC BLOOD PRESSURE: 120 MMHG

## 2024-12-26 VITALS — TEMPERATURE: 98.7 F | OXYGEN SATURATION: 92 % | HEART RATE: 56 BPM | RESPIRATION RATE: 17 BRPM

## 2024-12-26 VITALS
OXYGEN SATURATION: 90 % | DIASTOLIC BLOOD PRESSURE: 39 MMHG | SYSTOLIC BLOOD PRESSURE: 104 MMHG | HEART RATE: 56 BPM | RESPIRATION RATE: 17 BRPM

## 2024-12-26 VITALS
OXYGEN SATURATION: 90 % | RESPIRATION RATE: 16 BRPM | DIASTOLIC BLOOD PRESSURE: 45 MMHG | SYSTOLIC BLOOD PRESSURE: 102 MMHG | HEART RATE: 61 BPM

## 2024-12-26 VITALS — HEART RATE: 61 BPM | OXYGEN SATURATION: 95 % | RESPIRATION RATE: 16 BRPM

## 2024-12-26 VITALS
HEART RATE: 60 BPM | SYSTOLIC BLOOD PRESSURE: 90 MMHG | RESPIRATION RATE: 15 BRPM | DIASTOLIC BLOOD PRESSURE: 37 MMHG | OXYGEN SATURATION: 93 %

## 2024-12-26 VITALS
HEART RATE: 68 BPM | RESPIRATION RATE: 21 BRPM | SYSTOLIC BLOOD PRESSURE: 102 MMHG | DIASTOLIC BLOOD PRESSURE: 40 MMHG | OXYGEN SATURATION: 94 %

## 2024-12-26 VITALS
TEMPERATURE: 98 F | RESPIRATION RATE: 20 BRPM | DIASTOLIC BLOOD PRESSURE: 44 MMHG | OXYGEN SATURATION: 98 % | HEART RATE: 60 BPM | SYSTOLIC BLOOD PRESSURE: 103 MMHG

## 2024-12-26 VITALS
OXYGEN SATURATION: 100 % | RESPIRATION RATE: 17 BRPM | SYSTOLIC BLOOD PRESSURE: 105 MMHG | DIASTOLIC BLOOD PRESSURE: 38 MMHG | HEART RATE: 54 BPM

## 2024-12-26 VITALS
OXYGEN SATURATION: 97 % | SYSTOLIC BLOOD PRESSURE: 96 MMHG | HEART RATE: 61 BPM | RESPIRATION RATE: 16 BRPM | DIASTOLIC BLOOD PRESSURE: 41 MMHG

## 2024-12-26 VITALS — OXYGEN SATURATION: 97 % | RESPIRATION RATE: 18 BRPM

## 2024-12-26 VITALS
SYSTOLIC BLOOD PRESSURE: 95 MMHG | TEMPERATURE: 98.5 F | HEART RATE: 59 BPM | DIASTOLIC BLOOD PRESSURE: 41 MMHG | RESPIRATION RATE: 17 BRPM | OXYGEN SATURATION: 93 %

## 2024-12-26 VITALS
DIASTOLIC BLOOD PRESSURE: 55 MMHG | SYSTOLIC BLOOD PRESSURE: 102 MMHG | HEART RATE: 57 BPM | RESPIRATION RATE: 16 BRPM | OXYGEN SATURATION: 100 %

## 2024-12-26 VITALS
OXYGEN SATURATION: 96 % | SYSTOLIC BLOOD PRESSURE: 97 MMHG | DIASTOLIC BLOOD PRESSURE: 39 MMHG | HEART RATE: 56 BPM | RESPIRATION RATE: 19 BRPM

## 2024-12-26 VITALS
OXYGEN SATURATION: 96 % | SYSTOLIC BLOOD PRESSURE: 86 MMHG | RESPIRATION RATE: 22 BRPM | HEART RATE: 59 BPM | DIASTOLIC BLOOD PRESSURE: 47 MMHG

## 2024-12-26 VITALS — HEART RATE: 57 BPM | OXYGEN SATURATION: 94 % | RESPIRATION RATE: 16 BRPM

## 2024-12-26 VITALS
DIASTOLIC BLOOD PRESSURE: 58 MMHG | RESPIRATION RATE: 16 BRPM | HEART RATE: 62 BPM | TEMPERATURE: 98 F | SYSTOLIC BLOOD PRESSURE: 123 MMHG | OXYGEN SATURATION: 99 %

## 2024-12-26 VITALS
OXYGEN SATURATION: 97 % | DIASTOLIC BLOOD PRESSURE: 39 MMHG | HEART RATE: 55 BPM | SYSTOLIC BLOOD PRESSURE: 102 MMHG | RESPIRATION RATE: 21 BRPM

## 2024-12-26 VITALS
RESPIRATION RATE: 17 BRPM | OXYGEN SATURATION: 93 % | DIASTOLIC BLOOD PRESSURE: 42 MMHG | SYSTOLIC BLOOD PRESSURE: 99 MMHG | HEART RATE: 60 BPM

## 2024-12-26 VITALS — HEART RATE: 60 BPM | DIASTOLIC BLOOD PRESSURE: 35 MMHG | RESPIRATION RATE: 16 BRPM | SYSTOLIC BLOOD PRESSURE: 98 MMHG

## 2024-12-26 VITALS — OXYGEN SATURATION: 97 % | RESPIRATION RATE: 18 BRPM | HEART RATE: 59 BPM

## 2024-12-26 VITALS
DIASTOLIC BLOOD PRESSURE: 36 MMHG | OXYGEN SATURATION: 97 % | SYSTOLIC BLOOD PRESSURE: 104 MMHG | HEART RATE: 53 BPM | RESPIRATION RATE: 17 BRPM

## 2024-12-26 VITALS
OXYGEN SATURATION: 100 % | RESPIRATION RATE: 19 BRPM | SYSTOLIC BLOOD PRESSURE: 98 MMHG | HEART RATE: 65 BPM | DIASTOLIC BLOOD PRESSURE: 40 MMHG

## 2024-12-26 VITALS
OXYGEN SATURATION: 95 % | RESPIRATION RATE: 12 BRPM | DIASTOLIC BLOOD PRESSURE: 47 MMHG | SYSTOLIC BLOOD PRESSURE: 122 MMHG | HEART RATE: 62 BPM | TEMPERATURE: 97.9 F

## 2024-12-26 VITALS — HEART RATE: 55 BPM | OXYGEN SATURATION: 94 % | RESPIRATION RATE: 16 BRPM

## 2024-12-26 VITALS
DIASTOLIC BLOOD PRESSURE: 40 MMHG | HEART RATE: 57 BPM | OXYGEN SATURATION: 94 % | SYSTOLIC BLOOD PRESSURE: 100 MMHG | RESPIRATION RATE: 16 BRPM

## 2024-12-26 VITALS
SYSTOLIC BLOOD PRESSURE: 98 MMHG | DIASTOLIC BLOOD PRESSURE: 44 MMHG | RESPIRATION RATE: 18 BRPM | OXYGEN SATURATION: 93 % | HEART RATE: 64 BPM

## 2024-12-26 VITALS — HEART RATE: 61 BPM | OXYGEN SATURATION: 92 % | RESPIRATION RATE: 16 BRPM

## 2024-12-26 VITALS
OXYGEN SATURATION: 95 % | RESPIRATION RATE: 12 BRPM | HEART RATE: 61 BPM | SYSTOLIC BLOOD PRESSURE: 89 MMHG | DIASTOLIC BLOOD PRESSURE: 37 MMHG

## 2024-12-26 VITALS
HEART RATE: 58 BPM | RESPIRATION RATE: 14 BRPM | DIASTOLIC BLOOD PRESSURE: 40 MMHG | OXYGEN SATURATION: 85 % | SYSTOLIC BLOOD PRESSURE: 103 MMHG

## 2024-12-26 VITALS — RESPIRATION RATE: 20 BRPM | OXYGEN SATURATION: 98 %

## 2024-12-26 VITALS
DIASTOLIC BLOOD PRESSURE: 39 MMHG | HEART RATE: 57 BPM | RESPIRATION RATE: 16 BRPM | OXYGEN SATURATION: 92 % | SYSTOLIC BLOOD PRESSURE: 102 MMHG

## 2024-12-26 VITALS — HEART RATE: 60 BPM

## 2024-12-26 LAB
ALBUMIN SERPL-MCNC: 3.5 G/DL (ref 3.2–4.8)
ALP SERPL-CCNC: 177 U/L (ref 46–116)
ALT SERPL-CCNC: 27 U/L (ref 7–40)
ANION GAP SERPL CALCULATED.3IONS-SCNC: 7 MMOL/L (ref 5–15)
BILIRUB SERPL-MCNC: 3.2 MG/DL (ref 0.2–1)
BUN SERPL-MCNC: 18 MG/DL (ref 9–23)
BUN/CREAT SERPL: 15.5 (ref 10–20)
CALCIUM SERPL-MCNC: 9.6 MG/DL (ref 8.7–10.4)
CENTROMERE B AB SER-ACNC: >8 AI (ref 0–0.9)
CHLORIDE SERPL-SCNC: 111 MMOL/L (ref 98–107)
CHROMATIN AB SERPL-ACNC: 0.4 AI (ref 0–0.9)
CO2 SERPL-SCNC: 24 MMOL/L (ref 20–31)
DACRYOCYTES BLD QL SMEAR: (no result)
DSDNA AB SER-ACNC: <1 IU/ML (ref 0–9)
ENA JO1 AB SER-ACNC: <0.2 AI (ref 0–0.9)
ENA SCL70 AB SER IA-ACNC: <0.2 AI (ref 0–0.9)
ENA SS-A AB SER-ACNC: <0.2 AI (ref 0–0.9)
ENA SS-B AB SER-ACNC: <0.2 AI (ref 0–0.9)
GLUCOSE SERPL-MCNC: 109 MG/DL (ref 74–106)
HCT VFR BLD AUTO: 25.9 % (ref 36–46)
HGB BLD-MCNC: 8.5 G/DL (ref 12.2–16.2)
MCH RBC QN AUTO: 34.1 PG (ref 28–32)
MCV RBC AUTO: 104.3 FL (ref 80–100)
NRBC BLD QL AUTO: 0.1 %
POTASSIUM SERPL-SCNC: 3.9 MMOL/L (ref 3.5–5.1)
PROT SERPL-MCNC: 5.8 G/DL (ref 5.7–8.2)
SODIUM SERPL-SCNC: 142 MMOL/L (ref 136–145)

## 2024-12-26 NOTE — DVHPN2
Progress Note - Dictate


Date Seen:  Dec 26, 2024


Medical Necessity Reason


Pt with a Central, PICC or Fol:  No


Subjective


Patient seen and examined at bedside.


Breathing comfortably on room air.


Overnight events reviewed.


vital signs





                                   Vital Sign








  Date Time  Temp Pulse Resp B/P (MAP) Pulse Ox O2 Delivery O2 Flow Rate FiO2


 


12/26/24 18:00   20  98 Room Air* 0 21


 


12/26/24 17:00 97.9 62  122/47 (72)    





 97.9       














                           Total Intake and Output   


 


 12/25/24 12/25/24 12/26/24





 15:00 23:00 07:00


 


Intake Total 70.625 ml 400 ml 300 ml


 


Output Total  270 ml 401 ml


 


Balance 70.625 ml 130 ml -101 ml








medications





                               Current Medications








 Medications  Dose


 Ordered  Sig/Kiran


 Route  Start Time


 Stop Time Status Last Admin


Dose Admin


 


 Pantoprazole


 Sodium  40 mg  BID


 IV  12/23/24 10:00


    12/26/24 10:03


40 MG


 


 Ceftriaxone Sodium  50 ml @ 


 100 mls/hr  DAILY


 IV  12/22/24 22:00


    12/26/24 10:03


100 MLS/HR


 


 Furosemide  40 mg  DAILY


 PO  12/23/24 10:00


    12/25/24 10:00


40 MG


 


 Spironolactone  100 mg  DAILY


 PO  12/23/24 10:00


    12/25/24 09:59


100 MG


 


 Ursodiol  300 mg  BID


 PO  12/23/24 22:00


    12/26/24 10:05


300 MG


 


 Sucralfate  1 gm  QID@0600,1130,1700,2200


 PO  12/24/24 11:30


    12/26/24 16:02


1 GM


 


 Midodrine  10 mg  TID@0600,1200,1800


 PO  12/25/24 03:00


    12/26/24 16:02


10 MG


 


 Norepinephrine


 Bitartrate  250 ml @ 


 3.75 mls/hr  Q24H


 IV  12/25/24 08:00


   Hold  





 


 Prednisone  20 mg  DAILY


 PO  12/27/24 10:00


     











objective


Gen.: Patient lying in bed in no apparent distress. Breathing on room air.


Head: Normocephalic, atraumatic.


Eyes: EOMI/PERRLA.


Ears: Normal hearing. Normal anatomy.


Neck/trachea: Trachea midline, supple.


Nose: Normal external anatomy.


Mouth: Moist mucous membranes.


Chest: Decreased air entry bilaterally. No wheezing or rhonchi.


Cardiovascular: Positive S1, positive S2. Regular rate and rhythm.


Abdomen: Positive bowel sounds in all 4 quadrants. Soft, non-tender, non-

distended.


: Deferred.


Rectal: Deferred.


Skin: Warm, dry. Intact.


Extremities: 2+ radial pulses bilaterally. No lower extremity edema.


Neuro: Awake, alert, oriented x3. No gross motor or sensory deficits. Cranial 

nerves II through XII intact. Gait not assessed.


laboratory and microbiology


                                Laboratory Tests


12/26/24 05:06

















Test


 12/26/24


05:06 Range/Units


 


 


Serum Glucose 109 H   mg/dL








Assessment/Plan


Impression:


Acute GI bleeding


History of liver cirrhosis s/p banding


History of autoimmune hepatitis


Hyperbilirubinemia with transaminitis


Ascites





Events:


Breathing on room air


No respiratory distress.





Off Levophed since 1350 on 12/25/24.


Hemodynamically stable.





On midodrine





Continue steroids


Continue antibiotics





Lasix/spironolactone on hold.





Monitor hemoglobin.





Patient is stable from the pulmonary standpoint for downgrade to telemetry.


 


Labs and imaging reviewed.


Rest of plan as noted below.





Plan:


Supplemental oxygen PRN


Titrate to keep O2 sats above 92%.





Continue antibiotics


Continue steroids - prednisone


Incentive spirometry





Pressors if necessary for hemodynamic support


Titrate to keep mean arterial pressure greater than 65 mmHg.





On midodrine





Monitor hemoglobin





Monitor renal function.


Monitor electrolytes. Supplement as necessary.


Monitor ins and outs.





GI prophylaxis - Protonix


DVT prophylaxis.





Prognosis: Poor given patient's multiple co-morbidities.





Rest of plan per hospitalist and other consultants.





Thank you Dr. Leidy Saini MD, for allowing me to participate in this patient's 

care.


Further recommendations will depend on the patient's clinical course.


Please do not hesitate to contact me if you have any questions or concerns.





This medical document was created using an electronic medical record system with

Dragon computerized dictation system. Although these documentations are being 

carefully reviewed, there may still be some phonetic and typographical changes. 

The errors are purely typographical, due to imperfection on the software 

program, and do not reflect any compromise in the patient's medical care.


Plan discussed with:  Patient, Other (DARRICK Ellison)











TALYA DIAZ MD             Dec 26, 2024 19:36

## 2024-12-26 NOTE — DVHPNRES
Progress Note


Date Seen:  Dec 26, 2024


Resident Creating Document:  HAMILTON LAMAS RESIDENT


Medical Necessity Reason


Pt with a Central, PICC or Fol:  No





Subjective


Review of Systems


Patient is a 69-year-old female with past medical history of liver cirrhosis 

diagnosed 3 years ago, s/p variceal banding 10 months ago, questionable 

autoimmune hepatitis and scleroderma, who came in due to abdominal pain that has

been ongoing for the past 8 days.  Patient rates the pain as 8/10, constant and 

radiating to bilateral shoulders, she describes the pain as burning in nature.  

Per patient's daughter, she had multiple episodes of melena and hematuria.








Past surgical history:  Status post cholecystectomy


Home medications:  Spironolactone, propranolol, ursodiol, Protonix


Past Hospitalization:  For similar symptoms in 2023


Social & Personal history:  Patient lives with her daughter.  Denies using 

tobacco, alcohol, drugs.





Allergies:  Denies


 








Patient seen and examined at bedside. Patient is alert and oriented to time, 

place person and responding to all questions.  Patient notes she had 7 bowel 

movements in the last 24 hours, brownish in color watery and nonbloody.  Patient

notes improving symptoms denies any chills.  Patient will be downgraded to 

telemetry today.





Objective


vital signs





                                   Vital Sign








  Date Time  Temp Pulse Resp B/P (MAP) Pulse Ox O2 Delivery O2 Flow Rate FiO2


 


12/26/24 14:10   16  94 Room Air* 0 21


 


12/26/24 14:10  55      


 


12/26/24 13:30    120/44 (69)    


 


12/26/24 12:00 98.0       





 98.0       














                           Total Intake and Output   


 


 12/25/24 12/25/24 12/26/24





 15:00 23:00 07:00


 


Intake Total 70.625 ml 400 ml 300 ml


 


Output Total  270 ml 401 ml


 


Balance 70.625 ml 130 ml -101 ml








medications





                               Current Medications








 Medications  Dose


 Ordered  Sig/Kiran


 Route  Start Time


 Stop Time Status Last Admin


Dose Admin


 


 Pantoprazole


 Sodium  40 mg  BID


 IV  12/23/24 10:00


    12/26/24 10:03


40 MG


 


 Ceftriaxone Sodium  50 ml @ 


 100 mls/hr  DAILY


 IV  12/22/24 22:00


    12/26/24 10:03


100 MLS/HR


 


 Furosemide  40 mg  DAILY


 PO  12/23/24 10:00


    12/25/24 10:00


40 MG


 


 Spironolactone  100 mg  DAILY


 PO  12/23/24 10:00


    12/25/24 09:59


100 MG


 


 Ursodiol  300 mg  BID


 PO  12/23/24 22:00


    12/26/24 10:05


300 MG


 


 Sucralfate  1 gm  QID@0600,1130,1700,2200


 PO  12/24/24 11:30


    12/26/24 11:50


1 GM


 


 Midodrine  10 mg  TID@0600,1200,1800


 PO  12/25/24 03:00


    12/26/24 11:50


10 MG


 


 Norepinephrine


 Bitartrate  250 ml @ 


 3.75 mls/hr  Q24H


 IV  12/25/24 08:00


     





 


 Prednisone  20 mg  DAILY


 PO  12/27/24 10:00


     











Examination


General Appearance:  Cooperative. Well developed. Well nourished.  NAD


Head Exam:  Normal inspection


Neck Exam:  Normal inspection. Non-tender. Normal alignment


Pulmonary/Respiratory:  Chest non-tender. Clear bilateral breath sounds, no 

crackles, no wheezing.


Cardiovascular/Chest: Regular rate and rhythm.  No murmurs.  No JVD.


Peripheral Pulses:  2+ Radial (R). 2+ Radial (L). 2+ Pedal (R). 2+ Pedal (L)


Abdominal Exam:  Normal bowel sounds. Soft.  normal abdomen, no visible veins, 

shifting dullness noted, mild.  Some abdominal tenderness to palpation, however,

improved since day 1.  No hepatospenomegaly.  No masses


Ankle Exam: Negative ankle edema


Lower extremities: Negative lower extremity edema


Neuro/Mental Status: A&O x4.  Coherent.


Thoughts/Psych:  Normal thought pattern.  Appropriate mood and affect.  Good 

judgement and insight


Skin Exam:  Normal inspection. Normal color. Warm. Dry


laboratory and microbiology


                                Laboratory Tests


12/26/24 05:06

















Test


 12/26/24


05:06 Range/Units


 


 


Serum Glucose 109 H   mg/dL








                                  Microbiology








 Date/Time


Source Procedure


Growth Status





 


 12/25/24 15:50


Urine - Midstream Clean Catch Urine Culture - Preliminary


 Resulted


 


 12/25/24 07:10


Nose MRSA Screen - Final


 Complete





 12/24/24 20:40


Stool Stool Culture - Preliminary


 Resulted


 


 12/24/24 20:40


Stool Shiga Toxin I & II - Final


 Resulted








Labs and/or images reviewed:  Labs reviewed by me, Image(s) reviewed by me





Problem List/Assessment/Plan


Problem List/Assessment/Plan


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  Meld-Na 14 points, child class B (8

points)


Scleroderma


Hyperbilirubinemia due to above


Variceal bleeding less likely


Small volume ascites


anemia requiring blood transfusion


- EGD:  1+ distal esophageal varices without any stigmata of recent bleeding.  2

cm sliding-type hiatal hernia with grade B erosive esophagitis and GE junction 

ulceration.  Mild-to-moderate portal hypertension gastropathy.


- abdominal ultrasound:  Cirrhotic liver with small volume ascites seen 

throughout the abdomen 


- IV ceftriaxone


- furosemide 40 mg p.o. daily, spironolactone 100 mg p.o. daily


- propranolol 10 mg p.o. daily, IV Protonix 40 mg b.i.d.


- 1 PRBC transfusion


- ursodiol


- started prednisone 20 mg p.o. daily





Symptomatic hypotension, improving


- IV  mL bolus x2 


- IV albumin 


- midodrine 10 mg t.i.d. 


- currently on norepinephrine at 2 micrograms/minute





Erosive esophagitis 


- Protonix 


- one drug Carafate suspension q.i.d.





Leukopenia, decreased absolute neutrophil count 


- we will continue to monitor





ANA, likely hemodynamically mediated/VMN on questionable CKD?


- monitor





History of autoimmune hepatitis


- ordered serum PIERO comprehensive panel, AFP








Goals of care:  Full code, discussed for >16 minutes on 12/23/24


Plan discussed with patient


Plan discussed with Dr. Smiley


Plan discussed with:  Patient, Daughter, Other (RN)





My Orders


My Orders





                           Orders - HAMILTON LAMAS








Procedure Category Date Status





  Time 


 


Insert Alnd Catheter MOHINI 12/25/24 In Process





  15:25 


 


Transfer Orders XFER 12/26/24 Transmitted





  12:38 











Date of Service:  Dec 26, 2024


Billing Provider:  SAMANTHA SMILEY MD


Common Visit Codes:  41020-BCFAMYKCHY INP/OBS CARE(HIGH)











HAMILTON LAMAS             Dec 26, 2024 14:41


SAMANTHA SMILEY MD               Dec 27, 2024 09:09

## 2024-12-26 NOTE — DVHPN2
Progress Note - Dictate


Date Seen:  Dec 26, 2024


Medical Necessity Reason


Pt with a Central, PICC or Fol:  No


Subjective


Patient was upgraded to GABO yesterday because of hypotension requiring some 

pressors 


Patient was treated with albumin and midodrine and her blood pressure is now 

improved


 No new complaints ; tolerating diet


Patient is stable and feels better


She is noted to have pancytopenia but this is now improving


Hemoglobin stable at 8.5, patient received 1 unit PRBC during this admission


EGD showed small hiatal hernia with esophagitis, 1+ varices and moderate portal 

hypertension gastropathy with no bleeding


There was report of some melena initially on admission but they denied any 

bright red blood per rectum.  She has been having some diarrhea possibly 

antibiotic related 


Patient stated she had a colonoscopy about two years ago at [facility] 


She does take ursodiol at home and was on a new medicine trial through [facility]


She will follow up with them next month as they are tentatively planning to do a

liver biopsy


Daughter was notified that I did use steroids for her on this admission as she 

has a history of scleroderma and I suspect autoimmune hepatitis


vital signs





                                   Vital Sign








  Date Time  Temp Pulse Resp B/P (MAP) Pulse Ox O2 Delivery O2 Flow Rate FiO2


 


12/26/24 10:00    89/37    


 


12/26/24 08:20   16  92 Room Air* 0 21


 


12/26/24 08:20  61      


 


12/26/24 08:15 98.7       





 98.7       














                           Total Intake and Output   


 


 12/25/24 12/25/24 12/26/24





 15:00 23:00 07:00


 


Intake Total 70.625 ml 400 ml 300 ml


 


Output Total  270 ml 401 ml


 


Balance 70.625 ml 130 ml -101 ml








medications





                               Current Medications








 Medications  Dose


 Ordered  Sig/Kiran


 Route  Start Time


 Stop Time Status Last Admin


Dose Admin


 


 Pantoprazole


 Sodium  40 mg  BID


 IV  12/23/24 10:00


    12/26/24 10:03


40 MG


 


 Ceftriaxone Sodium  50 ml @ 


 100 mls/hr  DAILY


 IV  12/22/24 22:00


    12/26/24 10:03


100 MLS/HR


 


 Furosemide  40 mg  DAILY


 PO  12/23/24 10:00


    12/25/24 10:00


40 MG


 


 Spironolactone  100 mg  DAILY


 PO  12/23/24 10:00


    12/25/24 09:59


100 MG


 


 Ursodiol  300 mg  BID


 PO  12/23/24 22:00


    12/26/24 10:05


300 MG


 


 Sucralfate  1 gm  QID@0600,1130,1700,2200


 PO  12/24/24 11:30


    12/26/24 06:01


1 GM


 


 Prednisone  30 mg  DAILY


 PO  12/25/24 10:00


    12/26/24 10:05


30 MG


 


 Midodrine  10 mg  TID@0600,1200,1800


 PO  12/25/24 03:00


    12/26/24 06:01


10 MG


 


 Norepinephrine


 Bitartrate  250 ml @ 


 3.75 mls/hr  Q24H


 IV  12/25/24 08:00


     











objective


eneral Appearance:  Alert, Oriented X3, Cooperative, weak


HEENT:  PERRLA, EOMI, mild pallor and scleral icterus


Lungs clear; CVS S1S2 rrr


Abdominal:  Abdomen is soft distended, flanks are full, Normal bowel sounds, 

Soft, No tenderness.


Extremities: Bilateral pedal edema +, No clubbing, No cyanosis, Normal pulses, 

No tenderness/swelling


Neuro:  Alert oriented x3 with no focal deficit


laboratory and microbiology


                                Laboratory Tests


12/26/24 05:06

















Test


 12/26/24


05:06 Range/Units


 


 


Serum Glucose 109 H   mg/dL








Problems(with codes):  


(1) Weakness


(2) Cirrhosis of liver


(3) Jaundice


(4) Ascites


(5) Macrocytic anemia


(6) Symptomatic anemia


(7) Transaminitis


Prognosis


Plan





Patient will be downgraded to the floor today if she remains stable 


Continue prednisone but I will decrease it down to 20 mg p.o. daily 


Continue ursodiol 300 mg p.o. twice a day


Review records of last colonoscopy from [facility]


I will check stool for WBC


Outpatient follow up with her hepatologist at [facility] and I also gave her my 

contact information to follow up in my office if required


Plan discussed with:  Patient, Daughter, Other (Dr. Saini)











PAL MAN MD                Dec 26, 2024 11:15

## 2024-12-27 VITALS — RESPIRATION RATE: 17 BRPM | OXYGEN SATURATION: 95 %

## 2024-12-27 VITALS
RESPIRATION RATE: 20 BRPM | DIASTOLIC BLOOD PRESSURE: 38 MMHG | SYSTOLIC BLOOD PRESSURE: 109 MMHG | OXYGEN SATURATION: 97 % | TEMPERATURE: 98.7 F | HEART RATE: 49 BPM

## 2024-12-27 VITALS
HEART RATE: 48 BPM | DIASTOLIC BLOOD PRESSURE: 51 MMHG | TEMPERATURE: 97.9 F | SYSTOLIC BLOOD PRESSURE: 127 MMHG | RESPIRATION RATE: 20 BRPM | OXYGEN SATURATION: 97 %

## 2024-12-27 VITALS
DIASTOLIC BLOOD PRESSURE: 52 MMHG | SYSTOLIC BLOOD PRESSURE: 101 MMHG | HEART RATE: 57 BPM | RESPIRATION RATE: 17 BRPM | TEMPERATURE: 98 F | OXYGEN SATURATION: 95 %

## 2024-12-27 VITALS
HEART RATE: 58 BPM | OXYGEN SATURATION: 94 % | DIASTOLIC BLOOD PRESSURE: 35 MMHG | SYSTOLIC BLOOD PRESSURE: 102 MMHG | TEMPERATURE: 98.1 F | RESPIRATION RATE: 17 BRPM

## 2024-12-27 VITALS — RESPIRATION RATE: 17 BRPM | OXYGEN SATURATION: 94 %

## 2024-12-27 VITALS — RESPIRATION RATE: 16 BRPM | OXYGEN SATURATION: 95 %

## 2024-12-27 VITALS — OXYGEN SATURATION: 95 % | RESPIRATION RATE: 17 BRPM

## 2024-12-27 VITALS — HEART RATE: 49 BPM | OXYGEN SATURATION: 95 % | RESPIRATION RATE: 17 BRPM

## 2024-12-27 VITALS
RESPIRATION RATE: 20 BRPM | TEMPERATURE: 97.8 F | OXYGEN SATURATION: 98 % | HEART RATE: 102 BPM | DIASTOLIC BLOOD PRESSURE: 35 MMHG | SYSTOLIC BLOOD PRESSURE: 98 MMHG

## 2024-12-27 VITALS
HEART RATE: 57 BPM | SYSTOLIC BLOOD PRESSURE: 101 MMHG | TEMPERATURE: 98 F | OXYGEN SATURATION: 95 % | RESPIRATION RATE: 17 BRPM | DIASTOLIC BLOOD PRESSURE: 48 MMHG

## 2024-12-27 LAB
ALBUMIN SERPL-MCNC: 3.6 G/DL (ref 3.2–4.8)
ALP SERPL-CCNC: 182 U/L (ref 46–116)
ALT SERPL-CCNC: 29 U/L (ref 7–40)
ANION GAP SERPL CALCULATED.3IONS-SCNC: 9 MMOL/L (ref 5–15)
BILIRUB SERPL-MCNC: 2.7 MG/DL (ref 0.2–1)
BUN SERPL-MCNC: 16 MG/DL (ref 9–23)
BUN/CREAT SERPL: 14 (ref 10–20)
CALCIUM SERPL-MCNC: 9.5 MG/DL (ref 8.7–10.4)
CHLORIDE SERPL-SCNC: 109 MMOL/L (ref 98–107)
CO2 SERPL-SCNC: 24 MMOL/L (ref 20–31)
DACRYOCYTES BLD QL SMEAR: (no result)
GLUCOSE SERPL-MCNC: 122 MG/DL (ref 74–106)
HCT VFR BLD AUTO: 27.6 % (ref 36–46)
HGB BLD-MCNC: 8.9 G/DL (ref 12.2–16.2)
MCH RBC QN AUTO: 34.1 PG (ref 28–32)
MCV RBC AUTO: 105.9 FL (ref 80–100)
NRBC BLD QL AUTO: 0 %
POTASSIUM SERPL-SCNC: 3.4 MMOL/L (ref 3.5–5.1)
PROT SERPL-MCNC: 5.9 G/DL (ref 5.7–8.2)
SODIUM SERPL-SCNC: 142 MMOL/L (ref 136–145)

## 2024-12-27 NOTE — DVHDSRES
Discharge Summary


Date of Admission


Resident Creating Document:  HAMILTON LAAMS RESIDENT


Dec 22, 2024 at 20:55





Date of Discharge:


Dec 27, 2024





Admitting Diagnosis


Acute intractable abdominal pain





Labs/Diagnostic Data:





                               Laboratory Results








Test


 12/27/24


05:33 12/25/24


15:50 12/25/24


06:55 12/24/24


20:40


 


White Blood Count


 3.4 10^3/uL


(4.4-10.8) 


 


 





 


Red Blood Count


 2.60 10^6/uL


(4.0-5.20) 


 


 





 


Hemoglobin


 8.9 g/dL


(12.2-16.2) 


 


 





 


Hematocrit


 27.6 %


(36.0-46.0) 


 


 





 


Mean Corpuscular Volume


 105.9 fL


(80.0-100.0) 


 


 





 


Mean Corpuscular Hemoglobin


 34.1 pg


(28.0-32.0) 


 


 





 


Mean Corpuscular Hemoglobin


Concent 32.2 g/dL


(32.0-36.0) 


 


 





 


Red Cell Distribution Width


 24.4 %


(11.8-14.3) 


 


 





 


Platelet Count


 114 10^3/uL


(140-450) 


 


 





 


Mean Platelet Volume


 7.8 fL


(6.9-10.8) 


 


 





 


Neutrophils (%) (Auto)


 83.5 %


(37.0-80.0) 


 


 





 


Lymphocytes (%) (Auto)


 10.3 %


(10.0-50.0) 


 


 





 


Monocytes (%) (Auto)


 5.7 %


(0.0-12.0) 


 


 





 


Eosinophils (%) (Auto)


 0.1 %


(0.0-7.0) 


 


 





 


Basophils (%) (Auto)


 0.4 %


(0.0-2.0) 


 


 





 


Neutrophils # (Auto)


 2.9 10 ^3/uL


(1.6-8.6) 


 


 





 


Lymphocytes # (Auto)


 0.4 10 ^3/uL


(0.4-5.4) 


 


 





 


Monocytes # (Auto)


 0.2 10 ^3/uL


(0-1.3) 


 


 





 


Eosinophils # (Auto)


 0 10 ^3/uL


(0-0.8) 


 


 





 


Basophils # (Auto)


 0 10 ^3/uL


(0-0.2) 


 


 





 


Nucleated Red Blood Cells 0.0 %    


 


Platelet Estimate Decreased    


 


Poikilocytosis (manual)     


 


Anisocytosis (manual) Slight    


 


Macrocytosis Slight    


 


Tear Drop Cells Few    


 


Sodium Level


 142 mmol/L


(136-145) 


 


 





 


Potassium Level


 3.4 mmol/L


(3.5-5.1) 


 


 





 


Chloride Level


 109 mmol/L


() 


 


 





 


Carbon Dioxide Level


 24 mmol/L


(20-31) 


 


 





 


Anion Gap 9 (5-15)    


 


Blood Urea Nitrogen


 16 mg/dL


(9-23) 


 


 





 


Creatinine


 1.14 mg/dL


(0.550-1.02) 


 


 





 


Glomerular Filtration Rate


Calc 53 mL/min


(>90) 


 


 





 


BUN/Creatinine Ratio


 14.0


(10.0-20.0) 


 


 





 


Serum Glucose


 122 mg/dL


() 


 


 





 


Calcium Level


 9.5 mg/dL


(8.7-10.4) 


 


 





 


Total Bilirubin


 2.7 mg/dL


(0.2-1.0) 


 


 





 


Aspartate Amino Transferase


(AST) 45 U/L (13-40) 


 


 


 





 


Alanine Aminotransferase (ALT) 29 U/L (7-40)    


 


Alkaline Phosphatase


 182 U/L


() 


 


 





 


Total Protein


 5.9 g/dL


(5.7-8.2) 


 


 





 


Albumin


 3.6 g/dL


(3.2-4.8) 


 


 





 


Urine Color


 


 Light-yellow


(Yellow) 


 





 


Urine Clarity  Clear (Clear)   


 


Urine pH  5.0 (5.0-9.0)   


 


Urine Specific Gravity


 


 1.005


(1.001-1.035) 


 





 


Urine Protein


 


 Negative


(Negative) 


 





 


Urine Ketones


 


 Negative


(Negative) 


 





 


Urine Blood


 


 Negative /uL


(Negative) 


 





 


Urine Nitrite


 


 Negative


(Negative) 


 





 


Urine Bilirubin


 


 Negative


(Negative) 


 





 


Urine Urobilinogen


 


 Normal mg/dL


(Negative) 


 





 


Urine Leukocyte Esterase


 


 Negative /uL


(Negative) 


 





 


Urine RBC  1 /hpf (0 - 4)   


 


Urine WBC  1 /hpf (0 - 5)   


 


Urine Squamous Epithelial


Cells 


 Few /hpf (<5) 


 


 





 


Urine Bacteria


 


 None seen /hpf


(None Seen) 


 





 


Urine Hyaline Casts


 


 Few /lpf (0 -


2) 


 





 


Urine Mucus


 


 Few (None


Seen) 


 





 


Urine Glucose


 


 Normal mg/dL


(Normal) 


 





 


Iron Level


 


 


 215 ug/dL


() 





 


Total Iron Binding Capacity


 


 


 336 ug/dL


(250-425) 





 


Percent Iron Saturation   64.0 % (15-50)  


 


Ammonia


 


 


 33 umol/L


(11-32) 





 


Stool for White Cells    None seen 


 


Test


 12/24/24


06:49 12/23/24


03:31 12/22/24


22:38 12/22/24


16:19


 


Differential Total Cells


Counted 100.0 (100) 


 


 


 





 


Neutrophils % (Manual) 93 (37.0-80.0)    


 


Band Neutrophils % (Manual) 1    


 


Lymphocytes % (Manual) 4 (10.0-50.0)    


 


Monocytes % (Manual) 2 (0-12)    


 


Eosinophils % (Manual) 0 (0-7)    


 


Basophils % (Manual) 0 (0.0-2.0)    


 


Metamyelocytes % (manual) 0    


 


Myelocytes % (Manual) 0    


 


Promyelocytes % (Manual) 0    


 


Blast Cells % (Manual) 0    


 


Reactive Lymphocytes 0    


 


Ferritin


 33.0 ng/mL


() 


 


 





 


Tumor Marker Alpha Fetoprotein


 


 2.8 ng/mL


(0.0-9.2) 


 





 


Anti-Nuclear Antibody Comment  Comment (.)   


 


MYRIAM-1 Antibody


 


 <0.2 AI


(0.0-0.9) 


 





 


SS-A/Ro Antibody


 


 <0.2 AI


(0.0-0.9) 


 





 


SS-B/La Antibody


 


 <0.2 AI


(0.0-0.9) 


 





 


Sm Antibody


 


 <0.2 AI


(0.0-0.9) 


 





 


RNP Antibody


 


 <0.2 AI


(0.0-0.9) 


 





 


Scl-70 (Scleroderma) Antibody


 


 <0.2 AI


(0.0-0.9) 


 





 


Anti-Double Strand DNA


Antibody 


 <1 IU/mL (0-9) 


 


 





 


Chromatin Antibody


 


 0.4 AI


(0.0-0.9) 


 





 


Centromere B Antibody


 


 >8.0 AI


(0.0-0.9) 


 





 


Vitamin B12 Level


 


 


 1228 pg/mL


(211-911) 





 


Folic Acid


 


 


 13.20 ng/mL


(>5.38) 





 


Prothrombin Time


 


 


 


 10.9 sec


(9.3-11.8)


 


Prothrombin Time INR


 


 


 


 1.03


(0.9-1.15)


 


Test


 12/22/24


14:43 


 


 





 


Hemoglobin A1c


 < 5.7 % A1C


(<5.7) 


 


 





 


B-Type Natriuretic Peptide


 311.63 pg/mL


(0-100) 


 


 





 


Thyroid Stimulating Hormone


(TSH) 2.30 uIU/mL


(0.55-4.78) 


 


 





 


Hepatitis A Antibody Total


 Positive


(Negative) 


 


 





 


Hepatitis B Surface Antigen


 Negative


(Negative) 


 


 





 


Hepatitis B Surface Antibody


 Negative


(Negative) 


 


 





 


Hepatitis B Core Total


Antibody Negative


(Negative) 


 


 





 


Hepatitis C Antibody


 Negative


(Negative) 


 


 








                             Other Laboratory Tests


12/27/24 05:33











Brief Hx & Hospital Course:


Patient is a 69-year-old female with past medical history of liver cirrhosis 

diagnosed 3 years ago, s/p variceal banding 10 months ago, questionable 

autoimmune hepatitis and scleroderma, who came in due to abdominal pain that has

been ongoing for the past 8 days.  Patient rates the pain as 8/10, constant and 

radiating to bilateral shoulders, she describes the pain as burning in nature.  

Per patient's daughter, she had multiple episodes of melena and hematuria.








Hospital course: 


Patient was noted to have a MELD score of 14 point and a Child Abraham class B, EGD

showed 1+ distal esophageal varices without any stigmata of recent bleeding.  2 

cm sliding-type hiatal hernia with grade B erosive esophagitis and GE junction 

ulceration.  Mild-to-moderate portal hypertension gastropathy.  Abdominal 

ultrasound showed cirrhotic liver with small volume ascites seen throughout the 

abdomen.  Patient was started on IV ceftriaxone, furosemide 40 mg p.o. daily, 

spironolactone 100 mg p.o. daily, propranolol 10 mg p.o. daily and IV Protonix 

40 mg b.i.d..  Patient received a total of 3 PRBC transfusions, 2 on day 1 of 

hospitalization and 1 on day 3 of hospitalization.  Patient was also continued 

on home medication ursodiol and started on prednisone 20 mg p.o. daily for 

likely autoimmune hepatitis.  During the course of her hospitalization, she 

became hypotensive and she was given 2 boluses of 250 mL NS, midodrine 10 mg, 

albumin, however, continued to remain hypotensive and was subsequently 

transferred to the GABO where she was started on Levophed drip at 2 

micrograms/minute which was eventually tapered and then stopped the next day as 

blood pressure came back up.  Patient was thereafter transferred back to 

[facility] telemetry floor.  On the day of discharge, patient appeared well and 

had stable vital signs, reported improving diarrhea symptoms and denied any 

active ongoing pain or discomfort.  Patient was instructed to follow up with her

primary care doctor and GI in the outpatient clinic, patient was also instructed

to follow up with her hepatologist Mara.  Patient was sent home with 

midodrine 10 mg p.o. t.i.d., pantoprazole 40 mg p.o. daily, prednisone 20 mg 

p.o. daily for 30 days and Carafate 1 g 4 times a day.  Details and plan of care

was explained to patient and daughter at bedside, all concerns were addressed 

and questions answered.  Her hospital course was uncomplicated.








General Appearance:  Cooperative. Well developed. Well nourished.  NAD


Head Exam:  Normal inspection


Neck Exam:  Normal inspection. Non-tender. Normal alignment


Pulmonary/Respiratory:  Chest non-tender. Clear bilateral breath sounds, no 

crackles, no wheezing.


Cardiovascular/Chest: Regular rate and rhythm.  No murmurs.  No JVD.


Peripheral Pulses:  2+ Radial (R). 2+ Radial (L). 2+ Pedal (R). 2+ Pedal (L)


Abdominal Exam:  Normal bowel sounds. Soft.  normal abdomen, no visible veins, 

shifting dullness noted, mild.  Some abdominal tenderness to palpation, however,

improved since day 1.  No hepatospenomegaly.  No masses


Ankle Exam: Negative ankle edema


Lower extremities: Negative lower extremity edema


Neuro/Mental Status: A&O x4.  Coherent.


Thoughts/Psych:  Normal thought pattern.  Appropriate mood and affect.  Good 

judgement and insight


Skin Exam:  Normal inspection. Normal color. Warm. Dry





Condition at Discharge:


Good





Final Diagnosis/Problems List





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


Scleroderma


Hyperbilirubinemia 


Variceal bleeding less likely 


Small volume ascites


Anemia requiring blood transfusion 


Symptomatic hypotension 


Erosive esophagitis 


Leukopenia, decreased absolute neutrophil count 


ANA, likely hemodynamically mediated/VMN on questionable CKD 


History of autoimmune hepatitis





Discharge Disposition:


Home





Discharge Instruct/Medications


Diet:  Regular


Activity:  No Restrictions, As Tolerated


Follow Up/Referral:  


Please follow up with GI doctor in the outpatient clinic in 1-2 weeks 





Please follow up in the DC clinic, repeat labs/CMP in 2 weeks 





Please follow up with hepatologist at [facility]


Medications:  


Protonix 40 mg twice a day





Midodrine 10 mg 3 times a day 





Prednisolone 20 mg for 1 month only 





Carafate 1 g 4 times per day as needed


Discharge Statement:


"Patient was advised to return to the ER or call 911 if any headaches, 

dizziness, shortness of breath, chest pain, abdominal pain, bleeding, fevers, or

worsening of medical condition.





Patient was counseled about treatment plan, medications, possible side effects, 

patientverbalized understanding. All questions were answered to the best of my 

ability.





This discharge took greater then 30 minutes in planning, reviewing 

documentation, counseling the patient, and discussing with other team members."





ASSESSMENT


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





Date of Service:  Dec 27, 2024


Billing Provider:  SAMANTHA SMILEY MD


Common Visit Codes:  74904-BOE/OBS DISCH DAY >30min











HAMILTON LAMAS             Dec 27, 2024 20:50


SAMANTHA SMILEY MD               Dec 30, 2024 08:37

## 2025-01-19 ENCOUNTER — HOSPITAL ENCOUNTER (INPATIENT)
Dept: HOSPITAL 15 - ER | Age: 70
LOS: 7 days | Discharge: HOME | DRG: 469 | End: 2025-01-26
Attending: INTERNAL MEDICINE | Admitting: INTERNAL MEDICINE
Payer: MEDICAID

## 2025-01-19 VITALS
OXYGEN SATURATION: 99 % | HEART RATE: 74 BPM | SYSTOLIC BLOOD PRESSURE: 104 MMHG | TEMPERATURE: 97.5 F | DIASTOLIC BLOOD PRESSURE: 54 MMHG | RESPIRATION RATE: 17 BRPM

## 2025-01-19 VITALS
TEMPERATURE: 97.5 F | SYSTOLIC BLOOD PRESSURE: 104 MMHG | HEART RATE: 74 BPM | DIASTOLIC BLOOD PRESSURE: 54 MMHG | OXYGEN SATURATION: 99 % | RESPIRATION RATE: 17 BRPM

## 2025-01-19 VITALS — WEIGHT: 157.63 LBS | HEIGHT: 61 IN | BODY MASS INDEX: 29.76 KG/M2

## 2025-01-19 VITALS — OXYGEN SATURATION: 97 % | HEART RATE: 57 BPM | RESPIRATION RATE: 16 BRPM

## 2025-01-19 VITALS — HEART RATE: 59 BPM

## 2025-01-19 DIAGNOSIS — R00.1: ICD-10-CM

## 2025-01-19 DIAGNOSIS — K76.82: ICD-10-CM

## 2025-01-19 DIAGNOSIS — D50.9: ICD-10-CM

## 2025-01-19 DIAGNOSIS — Z80.3: ICD-10-CM

## 2025-01-19 DIAGNOSIS — E87.1: ICD-10-CM

## 2025-01-19 DIAGNOSIS — N18.30: ICD-10-CM

## 2025-01-19 DIAGNOSIS — K74.60: ICD-10-CM

## 2025-01-19 DIAGNOSIS — I12.9: ICD-10-CM

## 2025-01-19 DIAGNOSIS — K76.7: ICD-10-CM

## 2025-01-19 DIAGNOSIS — N17.0: Primary | ICD-10-CM

## 2025-01-19 LAB
ALBUMIN SERPL-MCNC: 4.4 G/DL (ref 3.2–4.8)
ALP SERPL-CCNC: 180 U/L (ref 46–116)
ALT SERPL-CCNC: 221 U/L (ref 7–40)
ANION GAP SERPL CALCULATED.3IONS-SCNC: 12 MMOL/L (ref 5–15)
APTT PPP: < 20 SEC (ref 24.5–34.5)
BILIRUB SERPL-MCNC: 4 MG/DL (ref 0.2–1)
BUN SERPL-MCNC: 74 MG/DL (ref 9–23)
BUN/CREAT SERPL: 18.4 (ref 10–20)
CALCIUM SERPL-MCNC: 11.2 MG/DL (ref 8.7–10.4)
CHLORIDE SERPL-SCNC: 89 MMOL/L (ref 98–107)
CHOLEST SERPL-MCNC: 185 MG/DL (ref ?–200)
CO2 SERPL-SCNC: 29 MMOL/L (ref 20–31)
DACRYOCYTES BLD QL SMEAR: (no result)
GLUCOSE SERPL-MCNC: 144 MG/DL (ref 74–106)
HCT VFR BLD AUTO: 33.1 % (ref 36–46)
HDLC SERPL-MCNC: 34 MG/DL (ref 40–59)
HGB BLD-MCNC: 11.2 G/DL (ref 12.2–16.2)
INR PPP: 1.06 (ref 0.9–1.15)
LIPASE SERPL-CCNC: 34 U/L (ref 12–53)
MCH RBC QN AUTO: 35 PG (ref 28–32)
MCV RBC AUTO: 103.7 FL (ref 80–100)
NRBC BLD QL AUTO: 0.1 %
POTASSIUM SERPL-SCNC: 4.4 MMOL/L (ref 3.5–5.1)
PROT SERPL-MCNC: 7.3 G/DL (ref 5.7–8.2)
PROT UR STRIP.AUTO-MCNC: (no result) MG/DL
PROTHROMBIN TIME: 11.2 SEC (ref 9.3–11.8)
SODIUM SERPL-SCNC: 130 MMOL/L (ref 136–145)
TRIGL SERPL-MCNC: 180 MG/DL (ref ?–150)

## 2025-01-19 PROCEDURE — 47000 NEEDLE BIOPSY OF LIVER PERQ: CPT

## 2025-01-19 PROCEDURE — 80061 LIPID PANEL: CPT

## 2025-01-19 PROCEDURE — 85610 PROTHROMBIN TIME: CPT

## 2025-01-19 PROCEDURE — 85007 BL SMEAR W/DIFF WBC COUNT: CPT

## 2025-01-19 PROCEDURE — 86235 NUCLEAR ANTIGEN ANTIBODY: CPT

## 2025-01-19 PROCEDURE — 83880 ASSAY OF NATRIURETIC PEPTIDE: CPT

## 2025-01-19 PROCEDURE — 86709 HEPATITIS A IGM ANTIBODY: CPT

## 2025-01-19 PROCEDURE — 84443 ASSAY THYROID STIM HORMONE: CPT

## 2025-01-19 PROCEDURE — 84300 ASSAY OF URINE SODIUM: CPT

## 2025-01-19 PROCEDURE — 93306 TTE W/DOPPLER COMPLETE: CPT

## 2025-01-19 PROCEDURE — 36415 COLL VENOUS BLD VENIPUNCTURE: CPT

## 2025-01-19 PROCEDURE — 99152 MOD SED SAME PHYS/QHP 5/>YRS: CPT

## 2025-01-19 PROCEDURE — 93005 ELECTROCARDIOGRAM TRACING: CPT

## 2025-01-19 PROCEDURE — 83605 ASSAY OF LACTIC ACID: CPT

## 2025-01-19 PROCEDURE — 82140 ASSAY OF AMMONIA: CPT

## 2025-01-19 PROCEDURE — 85730 THROMBOPLASTIN TIME PARTIAL: CPT

## 2025-01-19 PROCEDURE — 76775 US EXAM ABDO BACK WALL LIM: CPT

## 2025-01-19 PROCEDURE — 81001 URINALYSIS AUTO W/SCOPE: CPT

## 2025-01-19 PROCEDURE — 76942 ECHO GUIDE FOR BIOPSY: CPT

## 2025-01-19 PROCEDURE — 80053 COMPREHEN METABOLIC PANEL: CPT

## 2025-01-19 PROCEDURE — 86803 HEPATITIS C AB TEST: CPT

## 2025-01-19 PROCEDURE — 84481 FREE ASSAY (FT-3): CPT

## 2025-01-19 PROCEDURE — 84484 ASSAY OF TROPONIN QUANT: CPT

## 2025-01-19 PROCEDURE — 85025 COMPLETE CBC W/AUTO DIFF WBC: CPT

## 2025-01-19 PROCEDURE — 87081 CULTURE SCREEN ONLY: CPT

## 2025-01-19 PROCEDURE — 82570 ASSAY OF URINE CREATININE: CPT

## 2025-01-19 PROCEDURE — 83690 ASSAY OF LIPASE: CPT

## 2025-01-19 PROCEDURE — 86706 HEP B SURFACE ANTIBODY: CPT

## 2025-01-19 PROCEDURE — 71045 X-RAY EXAM CHEST 1 VIEW: CPT

## 2025-01-19 PROCEDURE — 83516 IMMUNOASSAY NONANTIBODY: CPT

## 2025-01-19 PROCEDURE — 83935 ASSAY OF URINE OSMOLALITY: CPT

## 2025-01-19 PROCEDURE — 70450 CT HEAD/BRAIN W/O DYE: CPT

## 2025-01-19 PROCEDURE — 84100 ASSAY OF PHOSPHORUS: CPT

## 2025-01-19 PROCEDURE — 84156 ASSAY OF PROTEIN URINE: CPT

## 2025-01-19 PROCEDURE — 83735 ASSAY OF MAGNESIUM: CPT

## 2025-01-19 PROCEDURE — 87340 HEPATITIS B SURFACE AG IA: CPT

## 2025-01-19 PROCEDURE — 76705 ECHO EXAM OF ABDOMEN: CPT

## 2025-01-19 PROCEDURE — 86704 HEP B CORE ANTIBODY TOTAL: CPT

## 2025-01-19 PROCEDURE — 36010 PLACE CATHETER IN VEIN: CPT

## 2025-01-19 PROCEDURE — 86708 HEPATITIS A ANTIBODY: CPT

## 2025-01-19 PROCEDURE — 85027 COMPLETE CBC AUTOMATED: CPT

## 2025-01-19 PROCEDURE — 84439 ASSAY OF FREE THYROXINE: CPT

## 2025-01-19 PROCEDURE — 86225 DNA ANTIBODY NATIVE: CPT

## 2025-01-19 PROCEDURE — 82306 VITAMIN D 25 HYDROXY: CPT

## 2025-01-19 RX ADMIN — SODIUM CHLORIDE SCH MLS/HR: 0.9 INJECTION, SOLUTION INTRAVENOUS at 09:16

## 2025-01-19 RX ADMIN — ONDANSETRON HYDROCHLORIDE PRN MG: 2 INJECTION, SOLUTION INTRAMUSCULAR; INTRAVENOUS at 10:14

## 2025-01-19 RX ADMIN — SODIUM CHLORIDE ONE MLS/HR: 0.9 INJECTION, SOLUTION INTRAVENOUS at 02:25

## 2025-01-19 RX ADMIN — HYDROCODONE BITARTRATE AND ACETAMINOPHEN PRN TAB: 5; 325 TABLET ORAL at 10:14

## 2025-01-19 RX ADMIN — ENOXAPARIN SODIUM SCH MG: 30 INJECTION SUBCUTANEOUS at 10:15

## 2025-01-19 RX ADMIN — HYDROCODONE BITARTRATE AND ACETAMINOPHEN ONE TAB: 10; 325 TABLET ORAL at 03:37

## 2025-01-19 NOTE — DVHHP2
History of Present Illness


Reason for Visit:  Headaches


History of Present Illness


This 70-year-old [place] mainly speaking patient accompanied by daughter 

presents in the ED via EMS with a chief complaint of headaches.  The patient 

reports headaches associated with dizziness, chest burning pain, abdominal pain,

nausea, and vomiting started yesterday.  The patient denies syncope, 

palpitations, shortness of breath, or other acute symptoms.  The patient with 

past medical history of liver cirrhosis, hepatitis, anemia, and chronic kidney 

disease stage 3.


Past Medical History


As stated in HPI


Past Surgical History


Denies


Family History


Reviewed, non-contributory to the management of this case.


Past Social History


The patient lives at home, denies smoking, alcohol or illicit drugs abuse.





Review of Systems


Constitutional:  Yes: Weakness, Malaise; No: Fever, Chills, Sweats, Other


Eyes:  No: Pain, Vision change, Conjunctivae inflammation, Eyelid inflammation, 

Other, Redness


ENT:  No: Ear pain, Ear discharge, Nose pain, Nose discharge, Nose congestion, 

Mouth pain, Mouth swelling, Throat pain, Throat swelling, Other


Respiratory:  No: Cough, Dry, Shortness of breath, SOB with excertion, Wheezing,

Hemoptysis, Pleuritic Pain, Sputum, Wheezing, Other


Cardiovascular:  Chest Pain; No: Palpitations, Orthopnea, Paroxysmal Noc. 

Dyspnea, Edema, Lt Headedness, Other


Gastrointestinal:  Nausea, Vomiting, Abdominal Pain; No: Diarrhea, Constipation,

Melena, Hematochezia, Other


Genitourinary:  No Dysuria, No Frequency, No Incontinence, No Hematuria, No 

Retention, No Other


Musculoskeletal:  No: other, neck pain, shoulder pain, arm pain, back pain, hand

pain, leg pain, foot pain


Skin:  No: Rash, Lesions, Jaundice, Bruising, Other


Neurological:  Other (Dizziness)


Allergies:  


Coded Allergies:  


     NO KNOWN ALLERGIES (Unverified , 8/10/21)


Medications





                               Current Medications








 Medications  Dose


 Ordered  Sig/Kiran


 Route  Start Time


 Stop Time Status Last Admin


Dose Admin


 


 Sodium Chloride  1,000 ml @ 


 75 mls/hr  [id number]


 IV  1/19/25 09:45


   UNV  





 


 Acetaminophen/


 Hydrocodone Bitart  1 tab  Q4HP  PRN


 PO  1/19/25 09:45


   UNV  














Exam


Vital Signs





Vital Signs








  Date Time  Temp Pulse Resp B/P (MAP) Pulse Ox O2 Delivery O2 Flow Rate FiO2


 


1/19/25 09:12  54      


 


1/19/25 07:36   16  97 Room Air* 0 21


 


1/19/25 07:22 97.9   98/47 (64)    





 97.9       








General Appearance:  Alert, Oriented X3, Cooperative, mild distress


HEENT:  Atraumatic, PERRLA, EOMI, Mucous membr. moist/pink


Respiratory:  Clear to auscultation, Normal air movement


Cardiovascular:  Regular rate, Normal S1, Normal S2


Abdominal:  Normal bowel sounds, Soft, No tenderness


Extremities:  No clubbing, No cyanosis, No edema, Normal pulses


Skin:  No rashes, No breakdown, No significant lesion


Neuro:  Normal gait, Normal speech, Strength at 5/5 X4 ext, Normal tone


Psych/Mental Status:  Mental status NL





Labs/Xrays





                                      Labs








Test


 1/19/25


03:32 1/19/25


02:30 1/19/25


00:26 Range/Units


 


 


Troponin I High Sensitivity 21    </=34  ng/L


 


Urine Color  Yellow   Yellow  


 


Urine Clarity  Clear   Clear  


 


Urine pH  5.5   5.0-9.0  


 


Urine Specific Gravity  1.010   1.001-1.035  


 


Urine Protein  Trace H  Negative  


 


Urine Ketones  Negative   Negative  


 


Urine Blood  Negative   Negative  /uL


 


Urine Nitrite  Negative   Negative  


 


Urine Bilirubin  Negative   Negative  


 


Urine Urobilinogen  Normal   Negative  mg/dL


 


Urine Leukocyte Esterase  Negative   Negative  /uL


 


Urine RBC  <1   0 - 4  /hpf


 


Urine WBC  5   0 - 5  /hpf


 


Urine Squamous Epithelial


Cells 


 Few 


 


 <5  /hpf





 


Urine Bacteria  None seen   None Seen  /hpf


 


Urine Glucose  Normal   Normal  mg/dL


 


White Blood Count


 


 


 10.4 


 4.4-10.8


10^3/uL


 


Red Blood Count


 


 


 3.19 L


 4.0-5.20


10^6/uL


 


Hemoglobin   11.2 L 12.2-16.2  g/dL


 


Hematocrit   33.1 L 36.0-46.0  %


 


Mean Corpuscular Volume   103.7 H 80.0-100.0  fL


 


Mean Corpuscular Hemoglobin   35.0 H 28.0-32.0  pg


 


Mean Corpuscular Hemoglobin


Concent 


 


 33.8 


 32.0-36.0  g/dL





 


Red Cell Distribution Width   21.7 H 11.8-14.3  %


 


Platelet Count


 


 


 180 


 140-450


10^3/uL


 


Mean Platelet Volume   7.7  6.9-10.8  fL


 


Neutrophils (%) (Auto)   87.6 H 37.0-80.0  %


 


Lymphocytes (%) (Auto)   7.0 L 10.0-50.0  %


 


Monocytes (%) (Auto)   4.8  0.0-12.0  %


 


Eosinophils (%) (Auto)   0.0  0.0-7.0  %


 


Basophils (%) (Auto)   0.6  0.0-2.0  %


 


Neutrophils # (Auto)


 


 


 9.1 H


 1.6-8.6  10


^3/uL


 


Lymphocytes # (Auto)


 


 


 0.7 


 0.4-5.4  10


^3/uL


 


Monocytes # (Auto)   0.5  0-1.3  10 ^3/uL


 


Eosinophils # (Auto)   0  0-0.8  10 ^3/uL


 


Basophils # (Auto)   0.1  0-0.2  10 ^3/uL


 


Nucleated Red Blood Cells   0.1   %


 


Platelet Estimate   Adequate   


 


Anisocytosis (manual)   Slight   


 


Macrocytosis   Slight   


 


Tear Drop Cells   Few   


 


Stomatocytes   Few   


 


Sodium Level   130 L 136-145  mmol/L


 


Potassium Level   4.4  3.5-5.1  mmol/L


 


Chloride Level   89 L   mmol/L


 


Carbon Dioxide Level   29  20-31  mmol/L


 


Anion Gap   12  5-15  


 


Blood Urea Nitrogen   74 H 9-23  mg/dL


 


Creatinine


 


 


 4.03 H


 0.550-1.02


mg/dL


 


Glomerular Filtration Rate


Calc 


 


 12 


 >90  mL/min





 


BUN/Creatinine Ratio   18.4  10.0-20.0  


 


Serum Glucose   144 H   mg/dL


 


Lactic Acid Level   1.5  0.4-2.0  mmol/L


 


Calcium Level   11.2 H 8.7-10.4  mg/dL


 


Total Bilirubin   4.0 H 0.2-1.0  mg/dL


 


Aspartate Amino Transferase


(AST) 


 


 142 H


 13-40  U/L





 


Alanine Aminotransferase (ALT)   221 H 7-40  U/L


 


Alkaline Phosphatase   180 H   U/L


 


B-Type Natriuretic Peptide   157.54  0-100  pg/mL


 


Total Protein   7.3  5.7-8.2  g/dL


 


Albumin   4.4  3.2-4.8  g/dL


 


Lipase   34  12-53  U/L





PROCEDURE(s): HWOCT - HEAD WITHOUT CONTRAST


REASON: Severe headache


ORDER NUMBER(s): [id number], ACCESSION NUMBER(s): [id number]








Examination: HWOCT





CLINICAL INDICATION: Severe headache





COMPARISON: None.





CONTRAST USED: None.





TECHNIQUE: The examination was performed obtaining 5 mm slices without contrast.

Multiplanar reconstructions were obtained. CT scan done according to ALARA (As 

Low As Reasonably Achievable).





FINDINGS:





SUPRATENTORIAL BRAIN:





Cerebral Hemispheres: Small calcified granuloma is seen in the right frontal 

lobe and parasagittal cortex. No evidence of surrounding hypodensity is seen to 

suggest edema. There is no midline shift or mass effect, intra or extra-axial 

fluid collections or hemorrhage.





Periventricular White Matter/Basal Ganglia: No abnormal areas of altered 

attenuation within the periventricular white matter or basal ganglia.





POSTERIOR FOSSA: The brainstem is normal and the visualized cerebellar 

hemispheres are unremarkable.





VENTRICULAR SYSTEM: The ventricular system is normal in size. There is no 

evidence of hydrocephalus or transependymal flow of cerebrospinal fluid.





SKULL BASE AND PARASELLAR REGION: The skull base is normal with no parasellar 

masses or abnormalities identified.





CALVARIUM AND SCALP REGION: No abnormality is seen. Small calcific focus is seen

in the scalp in the posterior parietal region.





PARANASAL SINUSES: No significant inflammatory changes are identified in the 

paranasal sinuses.





IMPRESSION:





1.  Small calcified granuloma is seen in the right frontal lobe and parasagittal

cortex. No evidence of surrounding hypodensity is seen to suggest edema.





2.  No acute infarct or space-occupying lesion is seen.





3.  No intracranial hemorrhage or calvarial fracture.





Assessment/Plan


Assessment/Plan


# acute renal failure


Admit to med/tele unit


Nephrology consult


IV fluid


Indwelling urinary catheter





# Hyperbilirubinemia with transaminitis


# history of autoimmune hepatitis


# hx of Liver cirrhosis with banding


# anemia


hepatitis panel


Coagulation studies


Check ammonia level





# acute headaches


CT head reviewed


IV fluid


Monitor





# chest pain


Check echo


Consider cardiology consult as necessary





# nausea/vomiting


Antiemetics





DVT prophylaxis


Medical plan discussed with patient and daughter


Plan discussed with:  Patient, Daughter


My Orders





                          Orders - ZAY FITZPATRICK FNP








Procedure Category Date Status





  Time 


 


Admit ADMIT 1/19/25 Transmitted





  09:40 


 


Code Status CODE 1/19/25 Transmitted





  09:40 


 


Renal DIET 1/19/25 Transmitted





Standard(2gna,3gk,Lopho)  Lunch 


 


Sodium Chloride 0.9% PHA 1/19/25 Logged





  09:45 


 


Hydrocodone-Acet PHA 1/19/25 Logged





5/325mg Tab (Norco  09:45 


 


Ondansetron Hcl PHA 1/19/25 Transmitted





 (Zofran)  09:45 


 


Fall Risk Precautions [facility] 1/19/25 In Process





In Place  09:40 


 


Complete Blood Count LAB 1/20/25 Verified





  04:00 


 


Comprehensive LAB 1/20/25 Verified





Metabolic Panel  04:00 


 


Echo 2d Mode Cardiac US 1/19/25 Logged





DOP  09:40 


 


Condition: Fair [facility] 1/19/25 In Process





  09:40 


 


Enoxaparin Sodium PHA 1/19/25 Transmitted





 (Lovenox)  10:00 


 


Acetaminophen Tablet PHA 1/19/25 Transmitted





 (Tylenol Tablet)  09:45 


 


Morphine Sulfate PHA 1/19/25 Transmitted





Injection  09:45 


 


Nitroglycerin PHA 1/19/25 Transmitted





Sublingual (Ntrostat  09:45 


 


Morphine Sulfate PHA 1/19/25 Transmitted





Injection  09:45 


 


Stat Ekg For Chest [facility] 1/19/25 In Process





Pain  09:40 


 


Notify Md Of Changes [facility] 1/19/25 In Process





From Base  09:40 


 


Telemetry Monitor For [facility] 1/19/25 In Process





24 Hours  09:40 


 


Emergency Dysrhythmia [facility] 1/19/25 In Process





Protocol  09:40 


 


Rhythm Strips Once [facility] 1/19/25 In Process





Every Shift  09:40 


 


Oxygen By Nasal RT 1/19/25 Transmitted





Cannula  09:40 


 


Insert Land Catheter [facility] 1/19/25 In Process





  09:40 


 


Thyroid Stimulating LAB 1/19/25 Transmitted





Hormone  09:40 


 


Lipid Panel LAB 1/19/25 Transmitted





  09:40 


 


Hemoglobin A1c LAB 1/19/25 Transmitted





  09:40 


 


*Dr. Agustin Group CONS 1/19/25 Transmitted





-[place]  09:40 


 


Comprehensive LAB 1/19/25 Transmitted





Hepatitis Panel  09:40 











Date of Service:  Jan 19, 2025


Billing Provider:  ZAY FITZPATRICK


Common Visit Codes:  99223-INITIAL  INP/OBS CARE (HIGH)











ZAY FITZPATRICKP            Jan 19, 2025 09:50

## 2025-01-19 NOTE — DVH
Examination: HWOCT



CLINICAL INDICATION: Severe headache



COMPARISON: None.



CONTRAST USED: None.



TECHNIQUE: The examination was performed obtaining 5 mm slices without contrast. Multiplanar reconstr
uctions were obtained. CT scan done according to ALARA (As Low As Reasonably Achievable).



FINDINGS:



SUPRATENTORIAL BRAIN:



Cerebral Hemispheres: Small calcified granuloma is seen in the right frontal lobe and parasagittal co
rtex. No evidence of surrounding hypodensity is seen to suggest edema. There is no midline shift or m
ass effect, intra or extra-axial fluid collections or hemorrhage.



Periventricular White Matter/Basal Ganglia: No abnormal areas of altered attenuation within the periv
entricular white matter or basal ganglia.



POSTERIOR FOSSA: The brainstem is normal and the visualized cerebellar hemispheres are unremarkable.



VENTRICULAR SYSTEM: The ventricular system is normal in size. There is no evidence of hydrocephalus o
r transependymal flow of cerebrospinal fluid.



SKULL BASE AND PARASELLAR REGION: The skull base is normal with no parasellar masses or abnormalities
 identified.



CALVARIUM AND SCALP REGION: No abnormality is seen. Small calcific focus is seen in the scalp in the 
posterior parietal region.



PARANASAL SINUSES: No significant inflammatory changes are identified in the paranasal sinuses.



IMPRESSION:



1.  Small calcified granuloma is seen in the right frontal lobe and parasagittal cortex. No evidence 
of surrounding hypodensity is seen to suggest edema.



2.  No acute infarct or space-occupying lesion is seen.



3.  No intracranial hemorrhage or calvarial fracture.



4.  Suggest MRI brain correlation if clinically deemed necessary.

Electronically Signed

1/19/2025 02:36

Sammy Grubbs



Electronically Signed by: Romie Christianson at 01/19/2025 02:36:00 AM

## 2025-01-19 NOTE — ED.PDOC
History of Present Illness


HPI Comments


This patient is a 70-year-old female who arrives the ED today for evaluation of 

headache, dizziness, nausea, chest pain and abdominal pain as well as altered 

mental status today.  Per EMS, patient stated that symptoms came on earlier 

today and worsened throughout the day.  At time of my evaluation, patient was 

lethargic and sleepy.  EMS states the patient has a history of hypertension and 

liver cirrhosis.  Patient was hypertensive and bradycardic at arrival.


Chief Complaint:  Headache


Time Seen by MD:  00:11


Primary Care Provider:  SILVANA


Reviewed Notes:  Nurses Notes, Paramedic Notes


Allergies:  


Coded Allergies:  


     NO KNOWN ALLERGIES (Unverified , 8/10/21)


Home Meds


Active Scripts


Sucralfate (Carafate) 1 Gm/10 Ml Marlyn, 1 GM PO QID for 30 Days, #1 ML 3 Refills


   Prov:HAMILTON LAMAS RESIDENT         12/27/24


Prednisone (Prednisone) 20 Mg Tab, 20 MG PO DAILY for 30 Days, #30 MG


   Prov:HAMILTON LAMAS RESIDENT         12/27/24


Midodrine HCl (Midodrine Hydrochloride) 10 Mg Tab, 10 MG PO TID for 30 Days, #90

TAB 3 Refills


   Prov:HAMILTON LAMAS RESIDENT         12/27/24


Pantoprazole Sodium Sesquihydr (Protonix) 40 Mg Tab, 40 MG PO DAILY for 30 Days,

#30 TAB


   Prov:HAMILTON LAMAS RESIDENT         12/27/24


Information Source:  Patient, Emergency Med Personnel


Mode of Arrival:  Ambulatory


Severity:  Moderate


Timing:  Hours


Duration:  Since onset


Prehospital treatment:  12 Lead EKG





Past Medical History


PAST MEDICAL HISTORY:  Anemia, Liver


Surgical History:  Denies all surgeries


GYN History:  No Pertinent GYN History





Family History


Family History:  Reviewed,noncontributory to illness





Social History


Smoker:  Non-Smoker


Alcohol:  Denies ETOH Use


Drugs:  Denies Drug Use


Lives In:  Home





Constitutional:  reports: fatigue, weakness; denies: chills, diaphoresis, fever,

malaise, sweats, others


EENTM:  denies: blurred vision, double vision, ear bleeding, ear discharge, ear 

drainage, ear pain, ear ringing, eye pain, eye redness, hearing loss, mouth 

pain, mouth swelling, nasal discharge, nose bleeding, nose congestion, nose 

pain, photophobia, tearing, throat pain, throat swelling, voice changes, others


Respiratory:  denies: cough, hemoptysis, orthopnea, SOB at rest, shortness of 

breath, SOB with excertion, stridor, wheezing, others


Cardiovascular:  reports: chest pain; denies: dizzy spells, diaphoresis, Dyspnea

on exertion, edema, irregular heart beat, left arm pain, lightheadedness, 

palpitations, PND, syncope, others


Gastrointestinal:  reports: abdominal pain; denies: abdomen distended, blood 

streaked bowels, constipated, diarrhea, dysphagia, difficulty swallowing, 

hematemesis, melena, nausea, poor appetite, poor fluid intake, rectal bleeding, 

rectal pain, vomiting, others


Genitourinary:  denies: abnormal vagina bleeding, burning, dyspareunia, dysuria,

flank pain, frequency, hematuria, incontinence, pain, pregnant, vagina 

discharge, urgency, others


Neurological:  reports: dizziness, headache; denies: fainting, left sided 

numbness, left sided weakness, numbness, paresthesia, pre-existing deficit, 

right sided numbness, right sided weakness, seizure, speech problems, tingling, 

tremors, weakness, others


Musculoskeletal:  denies: back pain, gout, joint pain, joint swelling, muscle 

pain, muscle stiffness, neck pain, others


Integumetry:  denies: bruises, change in color, change in hair/nails, dryness, 

laceration, lesions, lumps, rash, wounds, others


Allergic/Immunocompromised:  denies: Difficulty Healing, Frequent Infections, 

Hives, Itching, others


Hematologic/Lymphatic:  denies: anemia, blood clots, easy bleeding, easy 

bruising, swollen glands, others


Endocrine:  denies: excessive hunger, excessive sweating, excessive thirst, 

excessive urination, flushing, intolerance to cold, intolerance to heat, 

unexplained weight gain, unexplained weight loss, others


Psychiatric:  denies: anxiety, bipolar disorder, depression, hopeless, panic 

disorder, schizophrenia, sleepless, suicidal, others





Physical Exam


General Appearance:  Moderate Distress (Moderate distress at time of ev

aluation.), Normal


HEENT:  Head (Cranial exam was unremarkable.  No signs of trauma.  No skull 

depressions or deformities.), Normal ENT Inspection, Pharynx Normal, TMs Normal


Neck:  Full Range of Motion, Non-Tender, Normal, Normal Inspection


Respiratory:  Chest Non-Tender, Lungs Clear, No Accessory Muscle Use, No Respir

atory Distress, Normal Breath Sounds, Other (Patient complains of burning 

pleuritic pain.)


Cardiovascular:  Bradycardia, No Edema, No JVD, No Murmur, No Gallop, Normal 

Peripheral Pulses


Breast Exam:  Deferred


Gastrointestinal:  No Pulsatile Mass, Normal Bowel Sounds, Soft, Other (Diffuse 

abdominal tenderness to palpation.  Nonspecific.  Abdomen was mildly rigid.)


Genitalia:  Deferred


Pelvic:  Deferred


Rectal:  Deferred


Extremities:  NOT DONE


Neurologic:  NOT DONE


Cerebellar Function:  NOT DONE


Reflexes:  NOT DONE


Skin:  Dry, Normal Color, Warm


Lymphatic:  No Adenopathy





Was a procedure done?


Was a procedure done?:  No





Differential Dx


Considerations may include:


Sepsis, electrolyte abnormality, liver cirrhosis, but acute renal injury, 

anemia, acute intracranial process





X-Ray, Labs, Meds, VS





                                   Vital Signs








  Date Time  Temp Pulse Resp B/P (MAP) Pulse Ox O2 Delivery O2 Flow Rate FiO2


 


1/19/25 00:03 98.4 56 18 166/72 (103) 95   








                                       Lab








Test


 1/19/25


01:23 1/19/25


00:26 Range/Units


 


 


Troponin I High Sensitivity Pending  19  </=34  ng/L


 


White Blood Count


 


 10.4 


 4.4-10.8


10^3/uL


 


Red Blood Count


 


 3.19 L


 4.0-5.20


10^6/uL


 


Hemoglobin  11.2 L 12.2-16.2  g/dL


 


Hematocrit  33.1 L 36.0-46.0  %


 


Mean Corpuscular Volume  103.7 H 80.0-100.0  fL


 


Mean Corpuscular Hemoglobin  35.0 H 28.0-32.0  pg


 


Mean Corpuscular Hemoglobin


Concent 


 33.8 


 32.0-36.0  g/dL





 


Red Cell Distribution Width  21.7 H 11.8-14.3  %


 


Platelet Count


 


 180 


 140-450


10^3/uL


 


Mean Platelet Volume  7.7  6.9-10.8  fL


 


Neutrophils (%) (Auto)  87.6 H 37.0-80.0  %


 


Lymphocytes (%) (Auto)  7.0 L 10.0-50.0  %


 


Monocytes (%) (Auto)  4.8  0.0-12.0  %


 


Eosinophils (%) (Auto)  0.0  0.0-7.0  %


 


Basophils (%) (Auto)  0.6  0.0-2.0  %


 


Neutrophils # (Auto)


 


 9.1 H


 1.6-8.6  10


^3/uL


 


Lymphocytes # (Auto)


 


 0.7 


 0.4-5.4  10


^3/uL


 


Monocytes # (Auto)  0.5  0-1.3  10 ^3/uL


 


Eosinophils # (Auto)  0  0-0.8  10 ^3/uL


 


Basophils # (Auto)  0.1  0-0.2  10 ^3/uL


 


Nucleated Red Blood Cells  0.1   %


 


Platelet Estimate  Pending   


 


Sodium Level  130 L 136-145  mmol/L


 


Potassium Level  4.4  3.5-5.1  mmol/L


 


Chloride Level  89 L   mmol/L


 


Carbon Dioxide Level  29  20-31  mmol/L


 


Anion Gap  12  5-15  


 


Blood Urea Nitrogen  74 H 9-23  mg/dL


 


Creatinine


 


 4.03 H


 0.550-1.02


mg/dL


 


Glomerular Filtration Rate


Calc 


 12 


 >90  mL/min





 


BUN/Creatinine Ratio  18.4  10.0-20.0  


 


Serum Glucose  144 H   mg/dL


 


Lactic Acid Level  1.5  0.4-2.0  mmol/L


 


Calcium Level  11.2 H 8.7-10.4  mg/dL


 


Total Bilirubin  4.0 H 0.2-1.0  mg/dL


 


Aspartate Amino Transferase


(AST) 


 142 H


 13-40  U/L





 


Alanine Aminotransferase (ALT)  221 H 7-40  U/L


 


Alkaline Phosphatase  180 H   U/L


 


B-Type Natriuretic Peptide  157.54  0-100  pg/mL


 


Total Protein  7.3  5.7-8.2  g/dL


 


Albumin  4.4  3.2-4.8  g/dL


 


Lipase  34  12-53  U/L








X-Ray, Labs, Meds, VS Comment


Multiple studies were pending at time of this note.  Studies that were returned 

confirmed a hyponatremia, anemia, what appears to be acute on chronic renal 

concerns and transaminitis representative of the liver cirrhosis.  Patient will 

be admitted for her electrolyte abnormalities as well as kidney concerns.  

Patient will receive a nephrology consult tomorrow.  Patient care will be 

transferred to Dr. Perla for evaluation of the additional labs when returned.  

Once reviewed, he will respond accordingly.  Patient will be admitted for 

management.


Time of 1ST Reevaluation:  01:52


Reevaluation 1ST:  Improved


Consultation:  PCP


Patient Education/Counseling:  Diagnosis, Treatment


Family Education/Counseling:  Diagnosis, Treatment





Departure 1


Departure


Time of Disposition:  01:54


Impression:  


   Primary Impression:  


   Weakness


   Additional Impressions:  


   Acute-on-chronic kidney injury


   Anemia


   Hyponatremia


   Transaminitis


Disposition:  09 ADMITTED AS INPATIENT


Condition:  Stable


Discharged With:  Self





Critical Care Note


Critical Care Time?:  No





Stability


Stability form required:  No





Heart Score


Heart Score:  








Heart Score Response (Comments) Value


 


History Slightly Suspicious 0


 


EKG Repolarization Disturb 1


 


Age >65 2


 


Risk Factors                            1 or 2 risk factors 1


 


Troponin Normal limit 0


 


Total  4

















FABRIZIO SALCEDO PAC               Jan 19, 2025 01:47

## 2025-01-19 NOTE — DVHSR
--------------- APPROVED REPORT --------------





EXAM: Two-dimensional and M-mode echocardiogram with Doppler and color Doppler.



Blood Pressure: 98/47 mmHg



INDICATION

Chest Pain 



RISK FACTORS

Height: 5'5", Weight: 275



DIMENSIONS 

LVDd3.7 (3.8-5.7cm)LA (2D) (1.9-4.0cm)Aortic Root3.8 (2.0-3.7cm)

LVDs2.5 (2.5-4.0cm)LA (MM) (1.9-4.0cm)Aortic Cusp Exc1.8 (1.5-2.0cm)

EF (%) 60.0 (55-70%)Rt. Atrium (1.9-4.0cm)Asc. Aorta3.3 cm

IVSd1.2 (0.7-1.1cm)RV (D) (1.8-2.4cm)

PWd1.2 (0.7-1.1cm)



Mitral Valve

MitralMitral Stenosis

E wave0.42m/sMV Mean GR.mmHg

A wave0.47m/sMV Peak GR.mmHg

E/A ratio0.92D MVAcm2

DECEL [id number] 1/2 Timems



Aortic Valve

Aortic ValveAortic Stenosis

V10.74m/Scarlet Mean GR.1mmHg

V20.87m/Scarlet Peak GR.3mmHg

LVOT Diameter1.9 (1.8-2.4cm)Doppler AVA2.41cm2



Pulmonic Valve

V20.74m/s



Tricuspid Valve

TR Velocity2.31m/s

[id number]



 Other Information 

Technically limited study due to  body habitus, patient sitting up.



Conclusion

very limited study

lvef  55-60 % by visual estimate

grade 1 diastolic dysfunction 

mild to moderate LVH 

normal rv function, mild enlargement

pericardial fat pad noted 

no severe valve abnormalities noted

## 2025-01-19 NOTE — DVH
Examination:  CXRP



Clinical Indication:  Shortness of breath.



Comparison:  None.



Technique: Frontal radiograph of the chest was obtained.



Findings:



Lungs are clear and well expanded with no pulmonary infiltrate or pleural effusion.



There is no pneumothorax.



The cardiomediastinal silhouette is within normal limits.



No acute osseous abnormality is seen.



Impression:



No acute cardiopulmonary disease is seen.





Electronically Signed

1/19/2025 02:44

Sammy Grubbs



Electronically Signed by: Romie Christianson at 01/19/2025 02:44:00 AM

## 2025-01-20 VITALS
SYSTOLIC BLOOD PRESSURE: 98 MMHG | TEMPERATURE: 97.9 F | HEART RATE: 67 BPM | OXYGEN SATURATION: 97 % | RESPIRATION RATE: 17 BRPM | DIASTOLIC BLOOD PRESSURE: 33 MMHG

## 2025-01-20 VITALS
RESPIRATION RATE: 17 BRPM | OXYGEN SATURATION: 100 % | TEMPERATURE: 97.2 F | DIASTOLIC BLOOD PRESSURE: 55 MMHG | SYSTOLIC BLOOD PRESSURE: 112 MMHG | HEART RATE: 60 BPM

## 2025-01-20 VITALS
RESPIRATION RATE: 17 BRPM | SYSTOLIC BLOOD PRESSURE: 92 MMHG | OXYGEN SATURATION: 100 % | HEART RATE: 70 BPM | DIASTOLIC BLOOD PRESSURE: 57 MMHG | TEMPERATURE: 97.6 F

## 2025-01-20 VITALS
SYSTOLIC BLOOD PRESSURE: 100 MMHG | HEART RATE: 67 BPM | DIASTOLIC BLOOD PRESSURE: 48 MMHG | RESPIRATION RATE: 18 BRPM | TEMPERATURE: 97.6 F | OXYGEN SATURATION: 97 %

## 2025-01-20 VITALS — OXYGEN SATURATION: 96 % | RESPIRATION RATE: 18 BRPM | HEART RATE: 63 BPM

## 2025-01-20 VITALS
HEART RATE: 67 BPM | OXYGEN SATURATION: 96 % | RESPIRATION RATE: 18 BRPM | SYSTOLIC BLOOD PRESSURE: 96 MMHG | DIASTOLIC BLOOD PRESSURE: 52 MMHG | TEMPERATURE: 98 F

## 2025-01-20 VITALS — HEART RATE: 84 BPM

## 2025-01-20 VITALS
SYSTOLIC BLOOD PRESSURE: 93 MMHG | HEART RATE: 61 BPM | TEMPERATURE: 97.7 F | RESPIRATION RATE: 16 BRPM | OXYGEN SATURATION: 97 % | DIASTOLIC BLOOD PRESSURE: 42 MMHG

## 2025-01-20 VITALS — HEART RATE: 70 BPM

## 2025-01-20 LAB
ALBUMIN SERPL-MCNC: 4 G/DL (ref 3.2–4.8)
ALP SERPL-CCNC: 142 U/L (ref 46–116)
ALT SERPL-CCNC: 196 U/L (ref 7–40)
ANION GAP SERPL CALCULATED.3IONS-SCNC: 10 MMOL/L (ref 5–15)
BILIRUB SERPL-MCNC: 3 MG/DL (ref 0.2–1)
BUN SERPL-MCNC: 70 MG/DL (ref 9–23)
BUN/CREAT SERPL: 24.3 (ref 10–20)
CALCIUM SERPL-MCNC: 9.8 MG/DL (ref 8.7–10.4)
CHLORIDE SERPL-SCNC: 100 MMOL/L (ref 98–107)
CO2 SERPL-SCNC: 26 MMOL/L (ref 20–31)
GLUCOSE SERPL-MCNC: 77 MG/DL (ref 74–106)
HCT VFR BLD AUTO: 29 % (ref 36–46)
HGB BLD-MCNC: 9.8 G/DL (ref 12.2–16.2)
MAGNESIUM SERPL-MCNC: 3 MG/DL (ref 1.6–2.6)
MCH RBC QN AUTO: 35.6 PG (ref 28–32)
MCV RBC AUTO: 104.8 FL (ref 80–100)
NRBC BLD QL AUTO: 0.1 %
POTASSIUM SERPL-SCNC: 4 MMOL/L (ref 3.5–5.1)
PROT SERPL-MCNC: 6.2 G/DL (ref 5.7–8.2)
PROT UR-MCNC: 32.8 MG/DL (ref 1–14)
SODIUM SERPL-SCNC: 136 MMOL/L (ref 136–145)

## 2025-01-20 RX ADMIN — SODIUM CHLORIDE SCH MCG: 9 INJECTION, SOLUTION INTRAVENOUS at 14:30

## 2025-01-20 RX ADMIN — MIDODRINE HYDROCHLORIDE SCH MG: 10 TABLET ORAL at 12:57

## 2025-01-20 NOTE — DVHINCON2
Date of service:  2025


Referring Physician


Yuli Hair





Reason for Consultation


Abdominal pain





History of Present Illness


This 70-year-old Green[place]ic mainly speaking patient accompanied by daughter 

presents in the ED via EMS with a chief complaint of headaches.  The patient 

reports headaches associated with dizziness, chest burning pain, abdominal pain,

nausea, and vomiting started yesterday.  The patient denies syncope, 

palpitations, shortness of breath, or other acute symptoms.  


Patient was complaining of some atypical chest pain epigastric pain and 

dyspepsia.  GI Services were asked to evaluate patient for the same.  Patient 

also had elevation in liver enzymes.  She was recently hospitalized in December 

and I had performed an endoscopy on that admission





Past Medical History


The patient with past medical history of liver cirrhosis, hepatitis, anemia, and

chronic kidney disease stage 3.  Scleroderma





Past Surgical History


Pancreatic surgery possible ERCP for pancreatic stone resection





Family History:  


FH: breast cancer


  G8 MOTHER


Scleroderma


  G8 FATHER, 





Allergies:  


Coded Allergies:  


     NO KNOWN ALLERGIES (Unverified , 8/10/21)


Home Meds


Active Scripts


Prednisone (Prednisone) 20 Mg Tab, 20 MG PO DAILY for 30 Days, #30 MG


   Prov:HAMILTON LAMAS RESIDENT         24


Midodrine HCl (Midodrine Hydrochloride) 10 Mg Tab, 10 MG PO TID for 30 Days, #90

TAB 3 Refills


   Prov:HAMILTON LAMAS RESIDENT         24


Pantoprazole Sodium Sesquihydr (Protonix) 40 Mg Tab, 40 MG PO DAILY for 30 Days,

#30 TAB


   Prov:HAMILTON LAMAS RESIDENT         24


Reported Medications


Sucralfate (CARAFATE SUSP) 1 Gm/10 Ml Ss, 5 ML PO QID


   25


Hydroxyzine Hcl (Hydroxyzine Hcl) 25 Mg Tab, 2 TAB PO BID PRN for FOR ITCHING


   25


Propranolol HCl (Propranolol Hydrochloride) 10 Mg Tab, 1 TAB PO TID


   25


Rifaximin (Xifaxan) 550 Mg Tab, 1 TAB PO BID


   25


Spironolactone (Spironolactone) 25 Mg Tab, 2 TAB PO BID


   25


Ursodiol (Ursodiol) 500 Mg Tab, 1 TAB PO TID


   25


Current Medications





                               Current Medications








 Medications


  (Trade)  Dose


 Ordered  Sig/Kiran


 Route


 PRN Reason  Start Time


 Stop Time Status Last Admin


 


 Midodrine


  (Proamatine


 Tablet)  10 mg  TID@0600,1200,1800


 PO


   25 12:00


    25 18:00





 


 Octreotide Acetate


  (SandoSTATIN)  100 mcg  TID


 SUBCUT


   25 14:00


    25 14:30














Review of Systems


Patient underwent a recent endoscopy about a month ago


Operative Report


DATE OF OPERATION:  24 





PROCEDURE:  Upper Endoscopy with biopsy.





PREOPERATIVE INDICATION:  The patient is a 69 -year-old female  undergoing 


endoscopy for anemia and epigastric pain an underlying cirrhosis of the liver





POSTOPERATIVE DIAGNOSES: 





1. She had 1+ distal esophageal varices without any stigmata of recent bleeding 





2. She had a 2 cm sliding-type hiatal hernia with grade B erosive esophagitis 

and GE junction ulceration





3. Mild-to-moderate portal hypertension gastropathy otherwise normal examination

up to the 2nd and 3rd part of the duodenal with no active bleeding no fresh or 

old blood in the GI tract





Vital Signs





                                   Vital Signs








  Date Time  Temp Pulse Resp B/P (MAP) Pulse Ox O2 Delivery O2 Flow Rate FiO2


 


25 20:00  70    Room Air* 0 21


 


25 17:00 97.2  17 112/55 (74) 100   





 97.2       








Physical Exam


General Appearance:  Alert, Oriented X3, Cooperative, mild distress


HEENT:  Atraumatic, PERRLA, EOMI, Mucous membr. moist/pink


Respiratory:  Clear to auscultation, Normal air movement


Cardiovascular:  Regular rate, Normal S1, Normal S2


Abdominal:  Normal bowel sounds, Soft, No tenderness


Extremities:  No clubbing, No cyanosis, No edema, Normal pulses


Skin:  No rashes, No breakdown, No significant lesion


Neuro:  Normal gait, Normal speech, Strength at 5/5 X4 ext, Normal tone


Psych/Mental Status:  Mental status NL





Labs/Diagnostic Data





                                      Labs








Test


 25


14:30 25


05:38 25


10:09 25


03:32 Range/Units


 


 


Urine Osmolality 474      mOsm/kg


 


Urine Creatinine


 69.04 


 


 


 


 30.0-125.0


mg/dL


 


Urine Protein/Creatinine Ratio 0.48      


 


Urine Sodium 45       mmol/L


 


Urine Total Protein 32.8 H    1-14  mg/dL


 


White Blood Count


 


 5.0 #


 


 


 4.4-10.8


10^3/uL


 


Red Blood Count


 


 2.76 L


 


 


 4.0-5.20


10^6/uL


 


Hemoglobin  9.8 L   12.2-16.2  g/dL


 


Hematocrit  29.0 #L   36.0-46.0  %


 


Mean Corpuscular Volume  104.8 H   80.0-100.0  fL


 


Mean Corpuscular Hemoglobin  35.6 H   28.0-32.0  pg


 


Mean Corpuscular Hemoglobin


Concent 


 34.0 


 


 


 32.0-36.0  g/dL





 


Red Cell Distribution Width  21.4 H   11.8-14.3  %


 


Platelet Count


 


 97 L


 


 


 140-450


10^3/uL


 


Mean Platelet Volume  7.5    6.9-10.8  fL


 


Neutrophils (%) (Auto)  76.4    37.0-80.0  %


 


Lymphocytes (%) (Auto)  14.4    10.0-50.0  %


 


Monocytes (%) (Auto)  8.6    0.0-12.0  %


 


Eosinophils (%) (Auto)  0.3    0.0-7.0  %


 


Basophils (%) (Auto)  0.3    0.0-2.0  %


 


Neutrophils # (Auto)


 


 3.8 


 


 


 1.6-8.6  10


^3/uL


 


Lymphocytes # (Auto)


 


 0.7 


 


 


 0.4-5.4  10


^3/uL


 


Monocytes # (Auto)  0.4    0-1.3  10 ^3/uL


 


Eosinophils # (Auto)  0    0-0.8  10 ^3/uL


 


Basophils # (Auto)  0    0-0.2  10 ^3/uL


 


Nucleated Red Blood Cells  0.1     %


 


Platelet Estimate  Decreased     


 


Anisocytosis (manual)  Slight     


 


Macrocytosis  Slight     


 


Sodium Level  136 #   136-145  mmol/L


 


Potassium Level  4.0    3.5-5.1  mmol/L


 


Chloride Level  100 #     mmol/L


 


Carbon Dioxide Level  26    20-31  mmol/L


 


Anion Gap  10    5-15  


 


Blood Urea Nitrogen  70 H   9-23  mg/dL


 


Creatinine


 


 2.88 H


 


 


 0.550-1.02


mg/dL


 


Glomerular Filtration Rate


Calc 


 17 


 


 


 >90  mL/min





 


BUN/Creatinine Ratio  24.3 H   10.0-20.0  


 


Serum Glucose  77      mg/dL


 


Calcium Level  9.8    8.7-10.4  mg/dL


 


Phosphorus Level  5.1    2.4-5.1  mg/dL


 


Magnesium Level  3.0 H   1.6-2.6  mg/dL


 


Total Bilirubin  3.0 H   0.2-1.0  mg/dL


 


Aspartate Amino Transferase


(AST) 


 115 H


 


 


 13-40  U/L





 


Alanine Aminotransferase (ALT)  196 H   7-40  U/L


 


Alkaline Phosphatase  142 H     U/L


 


Total Protein  6.2    5.7-8.2  g/dL


 


Albumin  4.0    3.2-4.8  g/dL


 


Vitamin D 25-Hydroxy  14.3 L   30.0-100  ng/mL


 


Prothrombin Time   11.2   9.3-11.8  sec


 


Prothrombin Time INR   1.06   0.9-1.15  


 


Activated Partial


Thromboplast Time 


 


 < 20.0 L


 


 24.5-34.5  SEC





 


Ammonia   29   11-32  umol/L


 


Troponin I High Sensitivity    21  </=34  ng/L


 


Triglycerides Level    180 H < 150  mg/dL


 


Cholesterol Level    185  < 200  mg/dL


 


LDL Cholesterol    121 H < 100  mg/dL


 


HDL Cholesterol    34 L 40-59  mg/dL


 


Thyroid Stimulating Hormone


(TSH) 


 


 


 0.29 L


 0.55-4.78


uIU/mL


 


Test


 25


02:30 25


00:26 


 


 Range/Units


 


 


Urine Color Yellow     Yellow  


 


Urine Clarity Clear     Clear  


 


Urine pH 5.5     5.0-9.0  


 


Urine Specific Gravity 1.010     1.001-1.035  


 


Urine Protein Trace H    Negative  


 


Urine Ketones Negative     Negative  


 


Urine Blood Negative     Negative  /uL


 


Urine Nitrite Negative     Negative  


 


Urine Bilirubin Negative     Negative  


 


Urine Urobilinogen Normal     Negative  mg/dL


 


Urine Leukocyte Esterase Negative     Negative  /uL


 


Urine RBC <1     0 - 4  /hpf


 


Urine WBC 5     0 - 5  /hpf


 


Urine Squamous Epithelial


Cells Few 


 


 


 


 <5  /hpf





 


Urine Bacteria None seen     None Seen  /hpf


 


Urine Glucose Normal     Normal  mg/dL


 


Tear Drop Cells  Few     


 


Stomatocytes  Few     


 


Lactic Acid Level  1.5    0.4-2.0  mmol/L


 


B-Type Natriuretic Peptide  157.54    0-100  pg/mL


 


Lipase  34    12-53  U/L


 


Hepatitis A Antibody Total  Positive H   Negative  


 


Hepatitis B Surface Antigen  Negative    Negative  


 


Hepatitis B Surface Antibody  Negative    Negative  


 


Hepatitis B Core Total


Antibody 


 Negative 


 


 


 Negative  





 


Hepatitis C Antibody  Negative    Negative  








                                  Microbiology








 Date/Time


Source Procedure


Growth Status





 


 25 19:55


IMPRESSION: 





1. Cirrhotic liver with small volume ascites seen throughout the abdomen.


Nose MRSA Screen - Final


 Complete











Problems(with codes):  


(1) Transaminitis


(2) Weakness


(3) Cirrhosis of liver


(4) Jaundice


(5) Ascites


(6) Macrocytic anemia


(7) Anemia


Plan/Recommendation


Plan





70-year-old lady with cirrhosis and impending hepatorenal failure 


Meld score is 22 points predictive of 19.6% three-month mortality 


Questionable history of autoimmune hepatitis, check workup


Monitor labs; hepatitis profile negative


Supportive care


Prognosis is guarded


IV steroids if liver enzymes worsen


Continue to monitor labs


I will follow up patient with you closely


Plan discussed with:  Other (None)











PAL MAN MD                2025 23:22

## 2025-01-20 NOTE — DVHINCON2
Date of service:  2025


Referring Physician


 Yuli Hair, nurse practitioner





Reason for Consultation


Acute kidney injury





History of Present Illness


Patient is a 70-year-old female with past medical history significant for 

nonalcoholic liver cirrhosis is admitted for abdominal pain and altered level of

consciousness.  On admission patient found to have elevated BUN creatinine 

nephrology is consulted for acute kidney injury





Past Medical History


Liver cirrhosis, nonalcoholic





Past Surgical History


Patient denies





Allergies:  


Coded Allergies:  


     NO KNOWN ALLERGIES (Unverified , 8/10/21)


Home Meds


Active Scripts


Prednisone (Prednisone) 20 Mg Tab, 20 MG PO DAILY for 30 Days, #30 MG


   Prov:HAMILTON LAMAS RESIDENT         24


Midodrine HCl (Midodrine Hydrochloride) 10 Mg Tab, 10 MG PO TID for 30 Days, #90

TAB 3 Refills


   Prov:HAMILTON LAMAS RESIDENT         24


Pantoprazole Sodium Sesquihydr (Protonix) 40 Mg Tab, 40 MG PO DAILY for 30 Days,

#30 TAB


   Prov:HAMILTON LAMAS RESIDENT         24


Reported Medications


Sucralfate (CARAFATE SUSP) 1 Gm/10 Ml Ss, 5 ML PO QID


   25


Hydroxyzine Hcl (Hydroxyzine Hcl) 25 Mg Tab, 2 TAB PO BID PRN for FOR ITCHING


   25


Propranolol HCl (Propranolol Hydrochloride) 10 Mg Tab, 1 TAB PO TID


   25


Rifaximin (Xifaxan) 550 Mg Tab, 1 TAB PO BID


   25


Spironolactone (Spironolactone) 25 Mg Tab, 2 TAB PO BID


   25


Ursodiol (Ursodiol) 500 Mg Tab, 1 TAB PO TID


   25


Current Medications





                               Current Medications








 Medications


  (Trade)  Dose


 Ordered  Sig/Kiran


 Route


 PRN Reason  Start Time


 Stop Time Status Last Admin


 


 Midodrine


  (Proamatine


 Tablet)  10 mg  TID@0600,1200,1800


 PO


   25 12:00


     





 


 Octreotide Acetate


  (SandoSTATIN)  100 mcg  TID


 SUBCUT


   25 14:00


     














Family History:  


FH: breast cancer


  G8 MOTHER


Scleroderma


  G8 FATHER, 





Review of Systems


Can not be obtained due mental status changes





H&P Exam


Vital Signs/I&O





                                   Vital Sign








  Date Time  Temp Pulse Resp B/P (MAP) Pulse Ox O2 Delivery O2 Flow Rate FiO2


 


25 09:00 98.0 67 18 96/52 (67) 96   





 98.0       


 


25 08:00      Room Air* 0 21














                               Intake and Output  


 


 25





 19:00 07:00


 


Intake Total  120 ml


 


Output Total  600 ml


 


Balance  -480 ml


 


  


 


Intake Oral  120 ml


 


Output Urine Total  600 ml








Physical Exam


Patient is awake but confused


Lungs clear to auscultation bilaterally


Cardiac exam regular rate and rhythm


GI soft nontender bowel sounds are present


 normal


Extremities no clubbing cyanosis or edema


Neuro patient awake but confused





Labs/Diagnostic Data


Labs/Diagnostic Data





                                Laboratory Tests








Test


 25


05:38 25


10:09 25


03:32 25


02:30 Range/Units


 


 


White Blood Count


 5.0 #


 


 


 


 4.4-10.8


10^3/uL


 


Red Blood Count


 2.76 L


 


 


 


 4.0-5.20


10^6/uL


 


Hemoglobin 9.8 L    12.2-16.2  g/dL


 


Hematocrit 29.0 #L    36.0-46.0  %


 


Mean Corpuscular Volume 104.8 H    80.0-100.0  fL


 


Mean Corpuscular Hemoglobin 35.6 H    28.0-32.0  pg


 


Mean Corpuscular Hemoglobin


Concent 34.0 


 


 


 


 32.0-36.0  g/dL





 


Red Cell Distribution Width 21.4 H    11.8-14.3  %


 


Platelet Count


 97 L


 


 


 


 140-450


10^3/uL


 


Mean Platelet Volume 7.5     6.9-10.8  fL


 


Neutrophils (%) (Auto) 76.4     37.0-80.0  %


 


Lymphocytes (%) (Auto) 14.4     10.0-50.0  %


 


Monocytes (%) (Auto) 8.6     0.0-12.0  %


 


Eosinophils (%) (Auto) 0.3     0.0-7.0  %


 


Basophils (%) (Auto) 0.3     0.0-2.0  %


 


Neutrophils # (Auto)


 3.8 


 


 


 


 1.6-8.6  10


^3/uL


 


Lymphocytes # (Auto)


 0.7 


 


 


 


 0.4-5.4  10


^3/uL


 


Monocytes # (Auto) 0.4     0-1.3  10 ^3/uL


 


Eosinophils # (Auto) 0     0-0.8  10 ^3/uL


 


Basophils # (Auto) 0     0-0.2  10 ^3/uL


 


Nucleated Red Blood Cells 0.1      %


 


Platelet Estimate Decreased      


 


Anisocytosis (manual) Slight      


 


Macrocytosis Slight      


 


Sodium Level 136 #    136-145  mmol/L


 


Potassium Level 4.0     3.5-5.1  mmol/L


 


Chloride Level 100 #      mmol/L


 


Carbon Dioxide Level 26     20-31  mmol/L


 


Anion Gap 10     5-15  


 


Blood Urea Nitrogen 70 H    9-23  mg/dL


 


Creatinine


 2.88 H


 


 


 


 0.550-1.02


mg/dL


 


Glomerular Filtration Rate


Calc 17 


 


 


 


 >90  mL/min





 


BUN/Creatinine Ratio 24.3 H    10.0-20.0  


 


Serum Glucose 77       mg/dL


 


Calcium Level 9.8     8.7-10.4  mg/dL


 


Total Bilirubin 3.0 H    0.2-1.0  mg/dL


 


Aspartate Amino Transferase


(AST) 115 H


 


 


 


 13-40  U/L





 


Alanine Aminotransferase (ALT) 196 H    7-40  U/L


 


Alkaline Phosphatase 142 H      U/L


 


Total Protein 6.2     5.7-8.2  g/dL


 


Albumin 4.0     3.2-4.8  g/dL


 


Vitamin D 25-Hydroxy 14.3 L    30.0-100  ng/mL


 


Prothrombin Time  11.2    9.3-11.8  sec


 


Prothrombin Time INR  1.06    0.9-1.15  


 


Activated Partial


Thromboplast Time 


 < 20.0 L


 


 


 24.5-34.5  SEC





 


Ammonia  29    11-32  umol/L


 


Troponin I High Sensitivity   21   </=34  ng/L


 


Triglycerides Level   180 H  < 150  mg/dL


 


Cholesterol Level   185   < 200  mg/dL


 


LDL Cholesterol   121 H  < 100  mg/dL


 


HDL Cholesterol   34 L  40-59  mg/dL


 


Thyroid Stimulating Hormone


(TSH) 


 


 0.29 L


 


 0.55-4.78


uIU/mL


 


Urine Color    Yellow  Yellow  


 


Urine Clarity    Clear  Clear  


 


Urine pH    5.5  5.0-9.0  


 


Urine Specific Gravity    1.010  1.001-1.035  


 


Urine Protein    Trace H Negative  


 


Urine Ketones    Negative  Negative  


 


Urine Blood    Negative  Negative  /uL


 


Urine Nitrite    Negative  Negative  


 


Urine Bilirubin    Negative  Negative  


 


Urine Urobilinogen    Normal  Negative  mg/dL


 


Urine Leukocyte Esterase    Negative  Negative  /uL


 


Urine RBC    <1  0 - 4  /hpf


 


Urine WBC    5  0 - 5  /hpf


 


Urine Squamous Epithelial


Cells 


 


 


 Few 


 <5  /hpf





 


Urine Bacteria    None seen  None Seen  /hpf


 


Urine Glucose    Normal  Normal  mg/dL


 


Test


 25


01:23 25


00:26 


 


 Range/Units


 


 


Troponin I High Sensitivity 21  19    </=34  ng/L


 


White Blood Count


 


 10.4 


 


 


 4.4-10.8


10^3/uL


 


Red Blood Count


 


 3.19 L


 


 


 4.0-5.20


10^6/uL


 


Hemoglobin  11.2 L   12.2-16.2  g/dL


 


Hematocrit  33.1 L   36.0-46.0  %


 


Mean Corpuscular Volume  103.7 H   80.0-100.0  fL


 


Mean Corpuscular Hemoglobin  35.0 H   28.0-32.0  pg


 


Mean Corpuscular Hemoglobin


Concent 


 33.8 


 


 


 32.0-36.0  g/dL





 


Red Cell Distribution Width  21.7 H   11.8-14.3  %


 


Platelet Count


 


 180 


 


 


 140-450


10^3/uL


 


Mean Platelet Volume  7.7    6.9-10.8  fL


 


Neutrophils (%) (Auto)  87.6 H   37.0-80.0  %


 


Lymphocytes (%) (Auto)  7.0 L   10.0-50.0  %


 


Monocytes (%) (Auto)  4.8    0.0-12.0  %


 


Eosinophils (%) (Auto)  0.0    0.0-7.0  %


 


Basophils (%) (Auto)  0.6    0.0-2.0  %


 


Neutrophils # (Auto)


 


 9.1 H


 


 


 1.6-8.6  10


^3/uL


 


Lymphocytes # (Auto)


 


 0.7 


 


 


 0.4-5.4  10


^3/uL


 


Monocytes # (Auto)  0.5    0-1.3  10 ^3/uL


 


Eosinophils # (Auto)  0    0-0.8  10 ^3/uL


 


Basophils # (Auto)  0.1    0-0.2  10 ^3/uL


 


Nucleated Red Blood Cells  0.1     %


 


Platelet Estimate  Adequate     


 


Anisocytosis (manual)  Slight     


 


Macrocytosis  Slight     


 


Tear Drop Cells  Few     


 


Stomatocytes  Few     


 


Sodium Level  130 L   136-145  mmol/L


 


Potassium Level  4.4    3.5-5.1  mmol/L


 


Chloride Level  89 L     mmol/L


 


Carbon Dioxide Level  29    20-31  mmol/L


 


Anion Gap  12    5-15  


 


Blood Urea Nitrogen  74 H   9-23  mg/dL


 


Creatinine


 


 4.03 H


 


 


 0.550-1.02


mg/dL


 


Glomerular Filtration Rate


Calc 


 12 


 


 


 >90  mL/min





 


BUN/Creatinine Ratio  18.4    10.0-20.0  


 


Serum Glucose  144 H     mg/dL


 


Lactic Acid Level  1.5    0.4-2.0  mmol/L


 


Calcium Level  11.2 H   8.7-10.4  mg/dL


 


Total Bilirubin  4.0 H   0.2-1.0  mg/dL


 


Aspartate Amino Transferase


(AST) 


 142 H


 


 


 13-40  U/L





 


Alanine Aminotransferase (ALT)  221 H   7-40  U/L


 


Alkaline Phosphatase  180 H     U/L


 


B-Type Natriuretic Peptide  157.54    0-100  pg/mL


 


Total Protein  7.3    5.7-8.2  g/dL


 


Albumin  4.4    3.2-4.8  g/dL


 


Lipase  34    12-53  U/L


 


Hepatitis B Core Total


Antibody 


 Negative 


 


 


 Negative  











                                  Microbiology








 Date/Time


Source Procedure


Growth Status





 


 25 19:55


Nose MRSA Screen - Final


 Complete











Assessment


Acute kidney injury likely hepatorenal syndrome


Hepatic encephalopathy


Hypotension


Nonalcoholic liver cirrhosis


Microcytic anemia





Recommendations


Closely monitor fluid and electrolytes


Avoid nephrotoxic medications


Land catheter strict I&Os


Check urine electrolytes


Check kidney ultrasound


Midodrine 10 mg p.o. t.i.d.


Octreotide 100 mcg subcu q.8 hours


GI consult


We will continue to follow








Patient seen and examined by myself.  I discussed my plan of care with daughter 

and the primary nurse at the bedside 


I would like to thank Yuli for the consult, will follow


Plan discussed with:  Daughter, Other (Nurse)











ANDIE FLORES MD        2025 12:54

## 2025-01-20 NOTE — DVH
US KIDNEY 



HISTORY: issac



COMPARISON: None



TECHNIQUE: Transverse and longitudinal grayscale and color Doppler images were obtained of the kidney
s and bladder.



FINDINGS: 



Right kidney:



Size: 8.4 cm



Cortical thickness: Normal



Echogenicity: Normal



Stones: None



Masses: None



Hydronephrosis: None



Ureters: Not well visualized.



Other: None



Left kidney:



Size: 8.4 cm



Cortical thickness: Normal



Echogenicity: Normal



Stones: None



Masses: 2.6 x 1.9 x 1.8 cm cyst.



Hydronephrosis: None



Ureters: Not well visualized.



Other: None



Bladder: Normal



Other: None.



IMPRESSION: 



Unremarkable renal ultrasound.



Electronically Signed by: Wilver Blanco at 01/20/2025 12:05:06 PM

## 2025-01-20 NOTE — ECG
[facility]

                                       

Test Date:    2025               Test Time:    02:07:57

Pat Name:     ANURAG MORALES              Department:   ER

Patient ID:   [id number]           Room:         [address] [place] [address] 4

Gender:       F                        Technician:   MS

:          [id number]               Requested By: FABRIZIO SALCEDO

Order Number: [id number]        Reading MD:   Calvin Renae

                                 Measurements

Intervals                              Axis          

Rate:         54                       P:            81

WY:           183                      QRS:          74

QRSD:         95                       T:            45

QT:           488                                    

QTc:          463                                    

                           Interpretive Statements

Sinus rhythm

RSR' in V1 or V2, right VCD or RVH

Borderline T abnormalities, anterior leads

Electronically Signed On 2025 16:28:04 PST by Calvin Renae



Please click the below link to view image of tracing.

## 2025-01-21 VITALS
HEART RATE: 85 BPM | TEMPERATURE: 98.7 F | DIASTOLIC BLOOD PRESSURE: 46 MMHG | RESPIRATION RATE: 19 BRPM | SYSTOLIC BLOOD PRESSURE: 105 MMHG | OXYGEN SATURATION: 98 %

## 2025-01-21 VITALS
OXYGEN SATURATION: 97 % | TEMPERATURE: 98.3 F | RESPIRATION RATE: 18 BRPM | DIASTOLIC BLOOD PRESSURE: 52 MMHG | HEART RATE: 94 BPM | SYSTOLIC BLOOD PRESSURE: 100 MMHG

## 2025-01-21 VITALS
SYSTOLIC BLOOD PRESSURE: 90 MMHG | RESPIRATION RATE: 18 BRPM | TEMPERATURE: 98.7 F | DIASTOLIC BLOOD PRESSURE: 45 MMHG | OXYGEN SATURATION: 98 % | HEART RATE: 93 BPM

## 2025-01-21 VITALS
HEART RATE: 78 BPM | OXYGEN SATURATION: 96 % | DIASTOLIC BLOOD PRESSURE: 41 MMHG | SYSTOLIC BLOOD PRESSURE: 103 MMHG | TEMPERATURE: 98.3 F | RESPIRATION RATE: 18 BRPM

## 2025-01-21 VITALS
SYSTOLIC BLOOD PRESSURE: 101 MMHG | RESPIRATION RATE: 20 BRPM | DIASTOLIC BLOOD PRESSURE: 60 MMHG | HEART RATE: 86 BPM | OXYGEN SATURATION: 96 % | TEMPERATURE: 99.9 F

## 2025-01-21 VITALS
DIASTOLIC BLOOD PRESSURE: 47 MMHG | RESPIRATION RATE: 19 BRPM | TEMPERATURE: 97.6 F | SYSTOLIC BLOOD PRESSURE: 91 MMHG | OXYGEN SATURATION: 97 % | HEART RATE: 70 BPM

## 2025-01-21 VITALS — OXYGEN SATURATION: 96 % | HEART RATE: 82 BPM | RESPIRATION RATE: 20 BRPM

## 2025-01-21 VITALS — HEART RATE: 89 BPM

## 2025-01-21 LAB
ALBUMIN SERPL-MCNC: 3.9 G/DL (ref 3.2–4.8)
ALP SERPL-CCNC: 136 U/L (ref 46–116)
ALT SERPL-CCNC: 169 U/L (ref 7–40)
ANION GAP SERPL CALCULATED.3IONS-SCNC: 8 MMOL/L (ref 5–15)
BILIRUB SERPL-MCNC: 3.3 MG/DL (ref 0.2–1)
BUN SERPL-MCNC: 37 MG/DL (ref 9–23)
BUN/CREAT SERPL: 21.8 (ref 10–20)
CALCIUM SERPL-MCNC: 9.6 MG/DL (ref 8.7–10.4)
CELLS COUNTED: 100
CHLORIDE SERPL-SCNC: 103 MMOL/L (ref 98–107)
CO2 SERPL-SCNC: 24 MMOL/L (ref 20–31)
GLUCOSE SERPL-MCNC: 153 MG/DL (ref 74–106)
HCT VFR BLD AUTO: 30.3 % (ref 36–46)
HGB BLD-MCNC: 10.2 G/DL (ref 12.2–16.2)
MCH RBC QN AUTO: 36 PG (ref 28–32)
MCV RBC AUTO: 107.1 FL (ref 80–100)
POTASSIUM SERPL-SCNC: 4 MMOL/L (ref 3.5–5.1)
PROT SERPL-MCNC: 6.1 G/DL (ref 5.7–8.2)
SODIUM SERPL-SCNC: 135 MMOL/L (ref 136–145)
T3FREE SERPL-MCNC: 1.75 PG/ML (ref 2.3–4.2)
T4 FREE SERPL-MCNC: 0.9 NG/DL (ref 0.89–1.76)

## 2025-01-21 RX ADMIN — METHYLPREDNISOLONE SODIUM SUCCINATE SCH MG: 40 INJECTION, POWDER, FOR SOLUTION INTRAMUSCULAR; INTRAVENOUS at 21:27

## 2025-01-21 NOTE — DVHPN2
Reviewed:  Care Plan, H&P, Labs, Medications, Previous Orders, Radiology


Changes from previous H/P or p:  No Changes


General:  Per HPI


Eyes:  No Pain, No Vision change, No Conjunctivae inflammation, No Eyelid 

inflammation, No Other, No Redness


ENT:  No Ear pain, No Ear discharge, No Nose pain, No Nose discharge, No Nose 

congestion, No Mouth pain, No Mouth swelling, No Throat pain, No Throat 

swelling, No Other


Cardiovascular:  Chest Pain; No Palpitations, No Orthopnea, No Paroxysmal Noc. 

Dyspnea, No Edema, No Lt Headedness, No Other


Respiratory:  No Cough, No Dry, No Shortness of breath, No SOB with excertion, 

No Wheezing, No Hemoptysis, No Pleuritic Pain, No Sputum, No Other


Gastrointestinal:  Nausea, Vomiting, Abdominal Pain; No Diarrhea, No 

Constipation, No Melena, No Hematochezia, No Other


Genitourinary:  No Dysuria, No Frequency, No Incontinence, No Hematuria, No 

Retention, No Other


Musculoskeletal:  No other, No neck pain, No shoulder pain, No arm pain, No back

pain, No hand pain, No leg pain, No foot pain


Skin:  No Rash, No Lesions, No Jaundice, No Bruising, No Other





Objective


Vitals





Vital Signs








  Date Time  Temp Pulse Resp B/P (MAP) Pulse Ox O2 Delivery O2 Flow Rate FiO2


 


1/21/25 17:00 98.7 93 18 90/45 (60) 98   





 98.7       


 


1/21/25 08:00      Room Air* 0 21








Intake/Output











                               Intake and Output 


 


 1/21/25





 07:00


 


Intake Total 2674 ml


 


Output Total 500 ml


 


Balance 2174 ml


 


 


 


Intake Oral 2024 ml


 


IV Total 650 ml


 


Output Urine Total 500 ml


 


# Voids 3


 


# Bowel Movements 1








Medications





                               Current Medications








 Medications  Dose


 Ordered  Sig/Kiran


 Route  Start Time


 Stop Time Status Last Admin


Dose Admin


 


 Sodium Chloride  1,000 ml @ 


 75 mls/hr  [id number]


 IV  1/19/25 09:45


    1/21/25 16:46


75 MLS/HR


 


 Acetaminophen/


 Hydrocodone Bitart  1 tab  Q4HP  PRN


 PO  1/19/25 09:45


    1/21/25 18:49


1 TAB


 


 Ondansetron HCl  4 mg  Q4HP  PRN


 IV  1/19/25 09:45


    1/19/25 10:14


4 MG


 


 Acetaminophen  650 mg  Q6HP  PRN


 PO  1/19/25 09:45


     





 


 Morphine Sulfate  2 mg  Q4HPRN  PRN


 IV  1/19/25 09:45


     





 


 Nitroglycerin  0.4 mg  Q5MINP  PRN


 SL  1/19/25 09:45


     





 


 Morphine Sulfate  2 mg  Q30M  PRN


 IV  1/19/25 09:45


     





 


 Midodrine  10 mg  TID@0600,1200,1800


 PO  1/20/25 12:00


    1/21/25 19:36


10 MG


 


 Octreotide Acetate  100 mcg  TID


 SUBCUT  1/20/25 14:00


    1/21/25 16:46


100 MCG


 


 Methylprednisolone


 Sodium Succinate  40 mg  BID


 IV  1/21/25 22:00


     





 


 Docusate Sodium  100 mg  BIDPRN  PRN


 PO  1/21/25 16:15


     














Laboratory Results


Laboratory Tests


1/21/25 15:30











Chemistry








Test


 1/21/25


15:30


 


Albumin


 3.9 g/dL


(3.2-4.8)


 


Calcium Level


 9.6 mg/dL


(8.7-10.4)


 


Total Protein


 6.1 g/dL


(5.7-8.2)








LFT








Test


 1/21/25


15:30


 


Alanine Aminotransferase (ALT)


 169 U/L (7-40)


H


 


Alkaline Phosphatase


 136 U/L


()  H


 


Aspartate Amino Transferase


(AST) 109 U/L


(13-40)  H


 


Total Bilirubin


 3.3 mg/dL


(0.2-1.0)  H








Urinalysis








Test


 1/19/25


02:30 1/20/25


14:30


 


Urine Color


 Yellow


(Yellow) 





 


Urine Clarity Clear (Clear)   


 


Urine pH 5.5 (5.0-9.0)   


 


Urine Specific Gravity


 1.010


(1.001-1.035) 





 


Urine Protein


 Trace


(Negative)  H 





 


Urine Ketones


 Negative


(Negative) 





 


Urine Blood


 Negative /uL


(Negative) 





 


Urine Nitrite


 Negative


(Negative) 





 


Urine Bilirubin


 Negative


(Negative) 





 


Urine Urobilinogen


 Normal mg/dL


(Negative) 





 


Urine Leukocyte Esterase


 Negative /uL


(Negative) 





 


Urine RBC


 <1 /hpf (0 -


4) 





 


Urine WBC


 5 /hpf (0 - 5)


 





 


Urine Squamous Epithelial


Cells Few /hpf (<5)  


 





 


Urine Bacteria


 None seen /hpf


(None Seen) 





 


Urine Glucose


 Normal mg/dL


(Normal) 





 


Urine Osmolality  474 mOsm/kg  


 


Urine Creatinine


 


 69.04 mg/dL


(30.0-125.0)


 


Urine Protein/Creatinine Ratio  0.48  


 


Urine Sodium


 


 45 mmol/L


()


 


Urine Total Protein


 


 32.8 mg/dL


(1-14)  H








Microbiology





                                  Microbiology








 Date/Time


Source Procedure


Growth Status





 


 1/19/25 19:55


Nose MRSA Screen - Final


 Complete











Assessment/Plan


Assessment/Plan


This 70-year-old Mozambican mainly speaking patient accompanied by daughter 

presents in the ED via EMS with a chief complaint of headaches.  The patient 

reports headaches associated with dizziness, chest burning pain, abdominal pain,

nausea, and vomiting started yesterday.  The patient denies syncope, 

palpitations, shortness of breath, or other acute symptoms.  The patient with 

past medical history of liver cirrhosis, hepatitis, anemia, and chronic kidney 

disease stage 3.





# acute renal failure





# Hyperbilirubinemia with transaminitis


# history of autoimmune hepatitis


# hx of Liver cirrhosis with banding


# anemia





# acute headaches


# chest pain





# nausea/vomiting











01/20/2025: still has abdominal pain. nephro is following for renal failure. 

chest pain improved


01/21/2025: discussed with pt regarding elevated liver enzymes. possible d/c in 

AM


Plan discussed with:  Patient





Date of Service:  Jan 21, 2025


Billing Provider:  NEYMAR PEDRO DO


Common Visit Codes:  10413-[id number] INP/OBS CARE(HIGH)











NEYMAR PEDRO DO                Jan 21, 2025 20:11

## 2025-01-21 NOTE — ECG
[facility]

                                       

Test Date:    2025               Test Time:    09:08:56

Pat Name:     ANURAG MORALES              Department:   ER

Patient ID:   [id number]           Room:         [address] [place] [address] 4

Gender:       F                        Technician:   HASEEB

:          1955               Requested By: FABRIZIO SALCEDO

Order Number: [id number]        Reading MD:   Calvin Renae

                                 Measurements

Intervals                              Axis          

Rate:         54                       P:            36

AL:           151                      QRS:          -10

QRSD:         99                       T:            29

QT:           496                                    

QTc:          471                                    

                           Interpretive Statements

Sinus rhythm

RSR' in V1 or V2, right VCD or RVH

Left ventricular hypertrophy

Electronically Signed On 2025 16:28:19 PST by Calvin Renae



Please click the below link to view image of tracing.

## 2025-01-21 NOTE — DVHPN2
Progress Note


Date Seen:  Jan 21, 2025


Medical Necessity Reason


Pt with a Central, PICC or Fol:  No





Subjective


Patient reports:  No new complaints


Other Systems:  


Patient seen and examined by myself today in follow-up





Objective


vital signs





                                   Vital Sign








  Date Time  Temp Pulse Resp B/P (MAP) Pulse Ox O2 Delivery O2 Flow Rate FiO2


 


1/21/25 09:00 99.9 86 20 101/60 (74) 96   





 99.9       


 


1/20/25 20:00      Room Air* 0 21














                           Total Intake and Output   


 


 1/20/25 1/20/25 1/21/25





 15:00 23:00 07:00


 


Intake Total 650 ml 1450 ml 574 ml


 


Output Total 300 ml  200 ml


 


Balance 350 ml 1450 ml 374 ml








medications





                               Current Medications








 Medications  Dose


 Ordered  Sig/Kiran


 Route  Start Time


 Stop Time Status Last Admin


Dose Admin


 


 Sodium Chloride  1,000 ml @ 


 75 mls/hr  [id number]


 IV  1/19/25 09:45


    1/21/25 03:36


75 MLS/HR


 


 Acetaminophen/


 Hydrocodone Bitart  1 tab  Q4HP  PRN


 PO  1/19/25 09:45


    1/20/25 15:19


1 TAB


 


 Ondansetron HCl  4 mg  Q4HP  PRN


 IV  1/19/25 09:45


    1/19/25 10:14


4 MG


 


 Enoxaparin Sodium  30 mg  DAILY


 SC  1/19/25 10:00


    1/21/25 10:18


30 MG


 


 Acetaminophen  650 mg  Q6HP  PRN


 PO  1/19/25 09:45


     





 


 Morphine Sulfate  2 mg  Q4HPRN  PRN


 IV  1/19/25 09:45


     





 


 Nitroglycerin  0.4 mg  Q5MINP  PRN


 SL  1/19/25 09:45


     





 


 Morphine Sulfate  2 mg  Q30M  PRN


 IV  1/19/25 09:45


     





 


 Midodrine  10 mg  TID@0600,1200,1800


 PO  1/20/25 12:00


    1/21/25 05:31


10 MG


 


 Octreotide Acetate  100 mcg  TID


 SUBCUT  1/20/25 14:00


    1/21/25 05:30


100 MCG








Examination:  LUNGS:Normal, CVS:Normal, MSK:Normal


laboratory and microbiology


                                Laboratory Tests


1/20/25 05:38

















Test


 1/20/25


05:38 Range/Units


 


 


Serum Glucose 77    mg/dL








                                  Microbiology








 Date/Time


Source Procedure


Growth Status





 


 1/19/25 19:55


Nose MRSA Screen - Final


 Complete











Problem List/Assessment/Plan


Problem List/Assessment/Plan


Acute kidney injury likely hepatorenal syndrome


Hepatic encephalopathy


Hypotension


Nonalcoholic liver cirrhosis


Microcytic anemia





Recommendations


Kidney function is improving


Increased urine output


Land catheter 


strict I&Os


kidney ultrasound reported within normal limits


Midodrine 10 mg p.o. t.i.d.


Octreotide 100 mcg subcu q.8 hours


GI consult


We will continue to follow


Plan discussed with:  Patient, Daughter





My Orders


My Orders





                        Orders - ANDIE FLORES MD








Procedure Category Date Status





  Time 


 


Midodrine Tablet PHA 1/20/25 In Process





 (Proamatine Tablet)  12:00 


 


Octreotide Acetate PHA 1/20/25 In Process





 (Sandostatin)  14:00 

















ANDIE FLORES MD        Jan 21, 2025 11:00

## 2025-01-21 NOTE — DVHPN2
Reviewed:  Care Plan, H&P, Labs, Medications, Previous Orders, Radiology


Changes from previous H/P or p:  No Changes


General:  Per HPI


Eyes:  No Pain, No Vision change, No Conjunctivae inflammation, No Eyelid 

inflammation, No Other, No Redness


ENT:  No Ear pain, No Ear discharge, No Nose pain, No Nose discharge, No Nose 

congestion, No Mouth pain, No Mouth swelling, No Throat pain, No Throat 

swelling, No Other


Cardiovascular:  Chest Pain; No Palpitations, No Orthopnea, No Paroxysmal Noc. 

Dyspnea, No Edema, No Lt Headedness, No Other


Respiratory:  No Cough, No Dry, No Shortness of breath, No SOB with excertion, 

No Wheezing, No Hemoptysis, No Pleuritic Pain, No Sputum, No Other


Gastrointestinal:  Nausea, Vomiting, Abdominal Pain; No Diarrhea, No 

Constipation, No Melena, No Hematochezia, No Other


Genitourinary:  No Dysuria, No Frequency, No Incontinence, No Hematuria, No 

Retention, No Other


Musculoskeletal:  No other, No neck pain, No shoulder pain, No arm pain, No back

pain, No hand pain, No leg pain, No foot pain


Skin:  No Rash, No Lesions, No Jaundice, No Bruising, No Other





Objective


Vitals





Vital Signs








  Date Time  Temp Pulse Resp B/P (MAP) Pulse Ox O2 Delivery O2 Flow Rate FiO2


 


1/21/25 09:00 99.9 86 20 101/60 (74) 96   





 99.9       


 


1/21/25 08:00      Room Air* 0 21








Intake/Output











                               Intake and Output 


 


 1/21/25





 07:00


 


Intake Total 2674 ml


 


Output Total 500 ml


 


Balance 2174 ml


 


 


 


Intake Oral 2024 ml


 


IV Total 650 ml


 


Output Urine Total 500 ml


 


# Voids 3


 


# Bowel Movements 1








Medications





                               Current Medications








 Medications  Dose


 Ordered  Sig/Kiran


 Route  Start Time


 Stop Time Status Last Admin


Dose Admin


 


 Sodium Chloride  1,000 ml @ 


 75 mls/hr  [id number]


 IV  1/19/25 09:45


    1/21/25 03:36


75 MLS/HR


 


 Acetaminophen/


 Hydrocodone Bitart  1 tab  Q4HP  PRN


 PO  1/19/25 09:45


    1/20/25 15:19


1 TAB


 


 Ondansetron HCl  4 mg  Q4HP  PRN


 IV  1/19/25 09:45


    1/19/25 10:14


4 MG


 


 Enoxaparin Sodium  30 mg  DAILY


 SC  1/19/25 10:00


    1/21/25 10:18


30 MG


 


 Acetaminophen  650 mg  Q6HP  PRN


 PO  1/19/25 09:45


     





 


 Morphine Sulfate  2 mg  Q4HPRN  PRN


 IV  1/19/25 09:45


     





 


 Nitroglycerin  0.4 mg  Q5MINP  PRN


 SL  1/19/25 09:45


     





 


 Morphine Sulfate  2 mg  Q30M  PRN


 IV  1/19/25 09:45


     





 


 Midodrine  10 mg  TID@0600,1200,1800


 PO  1/20/25 12:00


    1/21/25 13:01


10 MG


 


 Octreotide Acetate  100 mcg  TID


 SUBCUT  1/20/25 14:00


    1/21/25 05:30


100 MCG











Laboratory Results


Laboratory Tests


1/20/25 05:38











Urinalysis








Test


 1/19/25


02:30 1/20/25


14:30


 


Urine Color


 Yellow


(Yellow) 





 


Urine Clarity Clear (Clear)   


 


Urine pH 5.5 (5.0-9.0)   


 


Urine Specific Gravity


 1.010


(1.001-1.035) 





 


Urine Protein


 Trace


(Negative)  H 





 


Urine Ketones


 Negative


(Negative) 





 


Urine Blood


 Negative /uL


(Negative) 





 


Urine Nitrite


 Negative


(Negative) 





 


Urine Bilirubin


 Negative


(Negative) 





 


Urine Urobilinogen


 Normal mg/dL


(Negative) 





 


Urine Leukocyte Esterase


 Negative /uL


(Negative) 





 


Urine RBC


 <1 /hpf (0 -


4) 





 


Urine WBC


 5 /hpf (0 - 5)


 





 


Urine Squamous Epithelial


Cells Few /hpf (<5)  


 





 


Urine Bacteria


 None seen /hpf


(None Seen) 





 


Urine Glucose


 Normal mg/dL


(Normal) 





 


Urine Osmolality  474 mOsm/kg  


 


Urine Creatinine


 


 69.04 mg/dL


(30.0-125.0)


 


Urine Protein/Creatinine Ratio  0.48  


 


Urine Sodium


 


 45 mmol/L


()


 


Urine Total Protein


 


 32.8 mg/dL


(1-14)  H








Microbiology





                                  Microbiology








 Date/Time


Source Procedure


Growth Status





 


 1/19/25 19:55


Nose MRSA Screen - Final


 Complete











Assessment/Plan


Assessment/Plan


This 70-year-old Georgian mainly speaking patient accompanied by daughter 

presents in the ED via EMS with a chief complaint of headaches.  The patient 

reports headaches associated with dizziness, chest burning pain, abdominal pain,

nausea, and vomiting started yesterday.  The patient denies syncope, 

palpitations, shortness of breath, or other acute symptoms.  The patient with 

past medical history of liver cirrhosis, hepatitis, anemia, and chronic kidney 

disease stage 3.





# acute renal failure





# Hyperbilirubinemia with transaminitis


# history of autoimmune hepatitis


# hx of Liver cirrhosis with banding


# anemia





# acute headaches


# chest pain





# nausea/vomiting











01/20/2025: still has abdominal pain. nephro is following for renal failure. 

chest pain improved


Plan discussed with:  Patient


My Orders





                            Orders - NEYMAR PEDRO DO








Procedure Category Date Status





  Time 


 


* Gi Dvh On Call CONS 1/20/25 Transmitted





  18:00 











Date of Service:  Jan 20, 2025


Billing Provider:  NEYMAR PEDRO DO


Common Visit Codes:  60503-[id number] INP/OBS CARE(HIGH)











NEYMAR PEDRO DO                Jan 21, 2025 14:11

## 2025-01-21 NOTE — DVHPN2
Progress Note


Date Seen:  Jan 21, 2025


Resident Creating Document:  JAYDE HORN RESIDENT


Medical Necessity Reason


Pt with a Central, PICC or Fol:  No





Objective


vital signs





                                   Vital Sign








  Date Time  Temp Pulse Resp B/P (MAP) Pulse Ox O2 Delivery O2 Flow Rate FiO2


 


1/21/25 09:00 99.9 86 20 101/60 (74) 96   





 99.9       


 


1/21/25 08:00      Room Air* 0 21














                           Total Intake and Output   


 


 1/20/25 1/20/25 1/21/25





 15:00 23:00 07:00


 


Intake Total 650 ml 1450 ml 574 ml


 


Output Total 300 ml  200 ml


 


Balance 350 ml 1450 ml 374 ml








medications





                               Current Medications








 Medications  Dose


 Ordered  Sig/Kiran


 Route  Start Time


 Stop Time Status Last Admin


Dose Admin


 


 Sodium Chloride  1,000 ml @ 


 75 mls/hr  [id number]


 IV  1/19/25 09:45


    1/21/25 03:36


75 MLS/HR


 


 Acetaminophen/


 Hydrocodone Bitart  1 tab  Q4HP  PRN


 PO  1/19/25 09:45


    1/20/25 15:19


1 TAB


 


 Ondansetron HCl  4 mg  Q4HP  PRN


 IV  1/19/25 09:45


    1/19/25 10:14


4 MG


 


 Enoxaparin Sodium  30 mg  DAILY


 SC  1/19/25 10:00


    1/21/25 10:18


30 MG


 


 Acetaminophen  650 mg  Q6HP  PRN


 PO  1/19/25 09:45


     





 


 Morphine Sulfate  2 mg  Q4HPRN  PRN


 IV  1/19/25 09:45


     





 


 Nitroglycerin  0.4 mg  Q5MINP  PRN


 SL  1/19/25 09:45


     





 


 Morphine Sulfate  2 mg  Q30M  PRN


 IV  1/19/25 09:45


     





 


 Midodrine  10 mg  TID@0600,1200,1800


 PO  1/20/25 12:00


    1/21/25 13:01


10 MG


 


 Octreotide Acetate  100 mcg  TID


 SUBCUT  1/20/25 14:00


    1/21/25 05:30


100 MCG








Examination:  GENERAL:Normal, HEENT:Normal, NECK:Normal, LUNGS:Normal, 

CVS:Normal, ABDOMEN:Normal, MSK:Normal, SKIN:Normal, NEURO:Normal, :Normal


laboratory and microbiology


                                Laboratory Tests


1/20/25 05:38

















Test


 1/20/25


05:38 Range/Units


 


 


Serum Glucose 77    mg/dL








                                  Microbiology








 Date/Time


Source Procedure


Growth Status





 


 1/19/25 19:55


Nose MRSA Screen - Final


 Complete








Labs and/or images reviewed:  Labs reviewed by me, Image(s) reviewed by me





Problem List/Assessment/Plan


Problem List/Assessment/Plan


Reasons for consultation: ABDOMINAL PAIN





Hospitalization Summary: A 70-year-old Telugu-speaking patient, accompanied by 

her daughter, presented to the ED with headaches, dizziness, chest burning pain,

abdominal pain, nausea, and vomiting that began the previous day. She has a 

medical history of liver cirrhosis, hepatitis, anemia, and stage 3 chronic 

kidney disease. She denies syncope, palpitations, shortness of breath, smoking, 

alcohol, or drug abuse, and has no relevant family or surgical history. 





GI Assessment: 


#1+ distal esophageal varices


#impending hepatorenal failure 


#She had a 2 cm sliding-type hiatal hernia with grade B erosive esophagitis


#Mild-to-moderate portal hypertension gastropathy


#?Upper GI bleed 


#Hepatitis A positive 


#Transaminitis 


#Cirrhosis of liver


#Jaundice


#Meld score is 22 points predictive of 19.6% three-month mortality 


#Constipation with last BM in 3 days. 


--------------------------------------------------------------------------------


#Macrocytic anemia 


#mild to moderate thrombocytopenia 


#acute renal failure 


#Nephrology consult 


#Indwelling urinary catheter 


#history of autoimmune hepatitis 


#hx of Liver cirrhosis with banding


#Vitamin D-25 


#Left ventricular 


#s/p cholecystectomy. 








GI Plan: 


#Medications: Hold Lovenox as pletlets dropping. hepatitis treat with IV 

prednisolone 40 mg bid and then switch to oral at discharge. Docusate 100 mg 

bid. 


#Labs: CMP trend, PIERO, antismooth muscle ab, Hepatitis A IgM. workup for 

autoimmune hepatitis. 


#Imaging/other work up: IR consult to check liver biopsy, RUQ US. 


#Procedure: Asim had a EGD in 1 year and colonoscopy in 2 years ago. 


#please scheduled follow up with GI as outpatient with Dr. ADITHYA Moellre. 





Thank you so much for the opportunity to consult on your patient. GI team will 

follow the patient.  In case of any questions or concerns please feel free to 

reach out. 


Case and action plan discussed with Dr. Jessica Moeller. Complex care planning 

needed total 45 minutes of detailed discussion. The patient and caregiver team 

agreed to the plan.


Plan discussed with:  Patient, Other





My Orders


My Orders





                         Orders - JAYDE HORN








Procedure Category Date Status





  Time 


 


LIVER  1/21/25 Logged





  15:06 

















JAYDE HORN          Jan 21, 2025 15:13

## 2025-01-22 VITALS
RESPIRATION RATE: 18 BRPM | TEMPERATURE: 98.4 F | DIASTOLIC BLOOD PRESSURE: 58 MMHG | HEART RATE: 67 BPM | OXYGEN SATURATION: 100 % | SYSTOLIC BLOOD PRESSURE: 96 MMHG

## 2025-01-22 VITALS
RESPIRATION RATE: 16 BRPM | HEART RATE: 67 BPM | SYSTOLIC BLOOD PRESSURE: 122 MMHG | DIASTOLIC BLOOD PRESSURE: 37 MMHG | TEMPERATURE: 97.9 F | OXYGEN SATURATION: 99 %

## 2025-01-22 VITALS
RESPIRATION RATE: 20 BRPM | TEMPERATURE: 97.9 F | OXYGEN SATURATION: 97 % | SYSTOLIC BLOOD PRESSURE: 108 MMHG | DIASTOLIC BLOOD PRESSURE: 52 MMHG | HEART RATE: 66 BPM

## 2025-01-22 VITALS
DIASTOLIC BLOOD PRESSURE: 43 MMHG | SYSTOLIC BLOOD PRESSURE: 122 MMHG | OXYGEN SATURATION: 99 % | RESPIRATION RATE: 17 BRPM | TEMPERATURE: 98.2 F | HEART RATE: 67 BPM

## 2025-01-22 VITALS — OXYGEN SATURATION: 96 % | HEART RATE: 71 BPM | RESPIRATION RATE: 20 BRPM

## 2025-01-22 VITALS
OXYGEN SATURATION: 94 % | HEART RATE: 74 BPM | RESPIRATION RATE: 18 BRPM | DIASTOLIC BLOOD PRESSURE: 54 MMHG | TEMPERATURE: 97.7 F | SYSTOLIC BLOOD PRESSURE: 111 MMHG

## 2025-01-22 VITALS
HEART RATE: 71 BPM | OXYGEN SATURATION: 96 % | RESPIRATION RATE: 20 BRPM | DIASTOLIC BLOOD PRESSURE: 58 MMHG | SYSTOLIC BLOOD PRESSURE: 107 MMHG | TEMPERATURE: 97.8 F

## 2025-01-22 VITALS — HEART RATE: 75 BPM

## 2025-01-22 LAB
CENTROMERE B AB SER-ACNC: >8 AI (ref 0–0.9)
CHROMATIN AB SERPL-ACNC: 0.4 AI (ref 0–0.9)
DSDNA AB SER-ACNC: <1 IU/ML (ref 0–9)
ENA JO1 AB SER-ACNC: <0.2 AI (ref 0–0.9)
ENA SCL70 AB SER IA-ACNC: <0.2 AI (ref 0–0.9)
ENA SS-A AB SER-ACNC: <0.2 AI (ref 0–0.9)
ENA SS-B AB SER-ACNC: <0.2 AI (ref 0–0.9)

## 2025-01-22 NOTE — DVH
INDICATION: Transaminitis.



TECHNIQUE:  Multiple real-time sonographic images of the abdomen were obtained.  



COMPARISON: Abdominal ultrasound 12/23/2024.



FINDINGS: The liver is heterogeneous in echogenicity. Nodular contour compatible with cirrhosis. The 
liver measures 11.4 cm. No intrahepatic biliary ductal dilatation is noted. 



The gallbladder is absent.



The right kidney measures  8.5 cm. No hydronephrosis. 



The pancreas is not well visualized due to obscuration from bowel gas.



The visualized portions of the IVC and aorta are grossly unremarkable.



Small volume ascites.



IMPRESSION: 



1. Cirrhosis. 



2. Mild ascites.



3. Cholecystectomy.



Electronically Signed by: Tari Ocasio at 01/22/2025 09:00:05 AM

## 2025-01-22 NOTE — DVHPN2
Progress Note - Dictate


Date Seen:  Jan 22, 2025


Medical Necessity Reason


Pt with a Central, PICC or Fol:  No


Subjective


No new complaints 


Patient is resting comfortably 


Patient does see a hepatologist [place] the [place] at [facility] [place] however they were 

not able to make that appointment because it is too far in the patient is 

overall clinically ill


No active GI bleeding is reported 


She was diagnosed with suspected autoimmune hepatitis by her hepatologist


vital signs





                                   Vital Sign








  Date Time  Temp Pulse Resp B/P (MAP) Pulse Ox O2 Delivery O2 Flow Rate FiO2


 


1/22/25 21:00 98.2 67 17 122/43 (69) 99   





 98.2       


 


1/22/25 20:00      Room Air* 0 21














                           Total Intake and Output   


 


 1/21/25 1/21/25 1/22/25





 15:00 23:00 07:00


 


Intake Total 450 ml 450 ml 787.5 ml


 


Output Total 400 ml  


 


Balance 50 ml 450 ml 787.5 ml








medications





                               Current Medications








 Medications  Dose


 Ordered  Sig/Kiran


 Route  Start Time


 Stop Time Status Last Admin


Dose Admin


 


 Sodium Chloride  1,000 ml @ 


 75 mls/hr  [id number]


 IV  1/19/25 09:45


    1/22/25 21:36


75 MLS/HR


 


 Acetaminophen/


 Hydrocodone Bitart  1 tab  Q4HP  PRN


 PO  1/19/25 09:45


    1/21/25 18:49


1 TAB


 


 Ondansetron HCl  4 mg  Q4HP  PRN


 IV  1/19/25 09:45


    1/19/25 10:14


4 MG


 


 Acetaminophen  650 mg  Q6HP  PRN


 PO  1/19/25 09:45


     





 


 Morphine Sulfate  2 mg  Q4HPRN  PRN


 IV  1/19/25 09:45


     





 


 Nitroglycerin  0.4 mg  Q5MINP  PRN


 SL  1/19/25 09:45


     





 


 Morphine Sulfate  2 mg  Q30M  PRN


 IV  1/19/25 09:45


     





 


 Midodrine  10 mg  TID@0600,1200,1800


 PO  1/20/25 12:00


    1/22/25 17:34


10 MG


 


 Octreotide Acetate  100 mcg  TID


 SUBCUT  1/20/25 14:00


    1/22/25 21:36


100 MCG


 


 Methylprednisolone


 Sodium Succinate  40 mg  BID


 IV  1/21/25 22:00


    1/22/25 21:31


40 MG


 


 Docusate Sodium  100 mg  BIDPRN  PRN


 PO  1/21/25 16:15


     











objective


General Appearance:  Alert, Oriented X3, Cooperative, no distress


HEENT:  Atraumatic, PERRLA, EOMI, Mucous membr. moist/pink; mild scleral icterus


Respiratory:  Clear to auscultation, Normal air movement


Cardiovascular:  Regular rate, Normal S1, Normal S2


Abdominal:  Normal bowel sounds, Soft, No tenderness


Extremities:  No clubbing, No cyanosis, No edema, Normal pulses


Skin:  No rashes, No breakdown, No significant lesion


Neuro:  Normal gait, Normal speech, Strength at 5/5 X4 ext, Normal tone


Psych/Mental Status:  Mental status NL


laboratory and microbiology


                                Laboratory Tests


1/21/25 15:30

















Test


 1/21/25


15:30 Range/Units


 


 


Serum Glucose 153 H   mg/dL








Problems(with codes):  


(1) Autoimmune hepatitis


(2) Cirrhosis of liver


(3) Jaundice


(4) Ascites


(5) Macrocytic anemia


(6) Transaminitis


(7) Weakness


(8) Acute-on-chronic kidney injury


(9) Scleroderma


Prognosis


Assessment plan





Patient has acute on chronic decompensation of her chronic liver disease 

cirrhosis 


She may have underlying autoimmune hepatitis because of history of scleroderma 

positive PIERO 


Her hepatologist at [facility] had concurred with the diagnosis and she was 

being treated with steroids 


I have given her Solu-Medrol 40 mg q.12 hours 


In another 24-48 hours I will taper it down to once a day and when discharge 

patient will need to maintain herself on a Medrol Dosepak


Overall her prognosis is guarded daughter is aware ; meld score is 19 points


She agreed to follow up with me in my office upon discharge


Plan discussed with:  Patient, Daughter











PAL MAN MD                Jan 22, 2025 22:36

## 2025-01-22 NOTE — DVHPN2
Progress Note


Date Seen:  Jan 22, 2025


Medical Necessity Reason


Pt with a Central, PICC or Fol:  No





Subjective


Patient reports:  No new complaints


Other Systems:  


Patient seen and examined by myself today in follow-up





Objective


vital signs





                                   Vital Sign








  Date Time  Temp Pulse Resp B/P (MAP) Pulse Ox O2 Delivery O2 Flow Rate FiO2


 


1/22/25 13:09 97.9 66 20 108/52 (70) 97   





 97.9       


 


1/22/25 08:00      Room Air* 0 21














                           Total Intake and Output   


 


 1/21/25 1/21/25 1/22/25





 15:00 23:00 07:00


 


Intake Total 450 ml 450 ml 787.5 ml


 


Output Total 400 ml  


 


Balance 50 ml 450 ml 787.5 ml








medications





                               Current Medications








 Medications  Dose


 Ordered  Sig/Kiran


 Route  Start Time


 Stop Time Status Last Admin


Dose Admin


 


 Sodium Chloride  1,000 ml @ 


 75 mls/hr  [id number]


 IV  1/19/25 09:45


    1/22/25 05:39


75 MLS/HR


 


 Acetaminophen/


 Hydrocodone Bitart  1 tab  Q4HP  PRN


 PO  1/19/25 09:45


    1/21/25 18:49


1 TAB


 


 Ondansetron HCl  4 mg  Q4HP  PRN


 IV  1/19/25 09:45


    1/19/25 10:14


4 MG


 


 Acetaminophen  650 mg  Q6HP  PRN


 PO  1/19/25 09:45


     





 


 Morphine Sulfate  2 mg  Q4HPRN  PRN


 IV  1/19/25 09:45


     





 


 Nitroglycerin  0.4 mg  Q5MINP  PRN


 SL  1/19/25 09:45


     





 


 Morphine Sulfate  2 mg  Q30M  PRN


 IV  1/19/25 09:45


     





 


 Midodrine  10 mg  TID@0600,1200,1800


 PO  1/20/25 12:00


    1/22/25 12:53


10 MG


 


 Octreotide Acetate  100 mcg  TID


 SUBCUT  1/20/25 14:00


    1/22/25 14:36


100 MCG


 


 Methylprednisolone


 Sodium Succinate  40 mg  BID


 IV  1/21/25 22:00


    1/22/25 09:15


40 MG


 


 Docusate Sodium  100 mg  BIDPRN  PRN


 PO  1/21/25 16:15


     











Examination:  LUNGS:Normal (elf today in follow-up), CVS:Normal, MSK:Normal


laboratory and microbiology


                                Laboratory Tests


1/21/25 15:30

















Test


 1/21/25


15:30 Range/Units


 


 


Serum Glucose 153 H   mg/dL








                                  Microbiology








 Date/Time


Source Procedure


Growth Status





 


 1/19/25 19:55


Nose MRSA Screen - Final


 Complete











Problem List/Assessment/Plan


Problem List/Assessment/Plan


Acute kidney injury likely hepatorenal syndrome


Hepatic encephalopathy


Hypotension


Nonalcoholic liver cirrhosis


Microcytic anemia





Recommendations


Kidney function is improving


Increased urine output


Land catheter 


strict I&Os


kidney ultrasound reported within normal limits


Midodrine 10 mg p.o. t.i.d.


Octreotide 100 mcg subcu q.8 hours


GI consult


We will continue to follow


Plan discussed with:  Patient











ANDIE FLORES MD        Jan 22, 2025 16:18

## 2025-01-23 VITALS — HEART RATE: 59 BPM | DIASTOLIC BLOOD PRESSURE: 38 MMHG | SYSTOLIC BLOOD PRESSURE: 117 MMHG

## 2025-01-23 VITALS — OXYGEN SATURATION: 97 % | HEART RATE: 61 BPM

## 2025-01-23 VITALS
HEART RATE: 61 BPM | RESPIRATION RATE: 18 BRPM | TEMPERATURE: 98.2 F | SYSTOLIC BLOOD PRESSURE: 114 MMHG | DIASTOLIC BLOOD PRESSURE: 42 MMHG | OXYGEN SATURATION: 99 %

## 2025-01-23 VITALS
TEMPERATURE: 97.8 F | HEART RATE: 59 BPM | DIASTOLIC BLOOD PRESSURE: 26 MMHG | OXYGEN SATURATION: 97 % | RESPIRATION RATE: 18 BRPM | SYSTOLIC BLOOD PRESSURE: 114 MMHG

## 2025-01-23 VITALS — OXYGEN SATURATION: 97 % | RESPIRATION RATE: 18 BRPM | HEART RATE: 59 BPM

## 2025-01-23 VITALS
SYSTOLIC BLOOD PRESSURE: 114 MMHG | HEART RATE: 59 BPM | RESPIRATION RATE: 18 BRPM | TEMPERATURE: 98.6 F | DIASTOLIC BLOOD PRESSURE: 31 MMHG | OXYGEN SATURATION: 96 %

## 2025-01-23 VITALS
HEART RATE: 59 BPM | SYSTOLIC BLOOD PRESSURE: 116 MMHG | TEMPERATURE: 97.7 F | RESPIRATION RATE: 12 BRPM | DIASTOLIC BLOOD PRESSURE: 56 MMHG | OXYGEN SATURATION: 97 %

## 2025-01-23 RX ADMIN — DEXTROSE ONE MLS/HR: 50 INJECTION, SOLUTION INTRAVENOUS at 14:54

## 2025-01-23 RX ADMIN — CYANOCOBALAMIN ONE MCG: 1000 INJECTION, SOLUTION INTRAMUSCULAR at 14:53

## 2025-01-23 RX ADMIN — Medication ONE TAB: at 14:53

## 2025-01-23 RX ADMIN — Medication SCH MG: at 21:54

## 2025-01-23 NOTE — DVHPN2
Progress Note


Date Seen:  Jan 23, 2025


Medical Necessity Reason


Pt with a Central, PICC or Fol:  No





Subjective


Patient reports:  No new complaints





Objective


vital signs





                                   Vital Sign








  Date Time  Temp Pulse Resp B/P (MAP) Pulse Ox O2 Delivery O2 Flow Rate FiO2


 


1/23/25 12:16 98.2 61 18 114/42 (66) 99   





 98.2       


 


1/23/25 08:00      Room Air* 0 21














                           Total Intake and Output   


 


 1/22/25 1/22/25 1/23/25





 15:00 23:00 07:00


 


Intake Total  150 ml 


 


Balance  150 ml 








medications





                               Current Medications








 Medications  Dose


 Ordered  Sig/Kiran


 Route  Start Time


 Stop Time Status Last Admin


Dose Admin


 


 Sodium Chloride  1,000 ml @ 


 75 mls/hr  [id number]


 IV  1/19/25 09:45


    1/22/25 21:36


75 MLS/HR


 


 Acetaminophen/


 Hydrocodone Bitart  1 tab  Q4HP  PRN


 PO  1/19/25 09:45


    1/21/25 18:49


1 TAB


 


 Ondansetron HCl  4 mg  Q4HP  PRN


 IV  1/19/25 09:45


    1/19/25 10:14


4 MG


 


 Acetaminophen  650 mg  Q6HP  PRN


 PO  1/19/25 09:45


     





 


 Morphine Sulfate  2 mg  Q4HPRN  PRN


 IV  1/19/25 09:45


     





 


 Nitroglycerin  0.4 mg  Q5MINP  PRN


 SL  1/19/25 09:45


     





 


 Morphine Sulfate  2 mg  Q30M  PRN


 IV  1/19/25 09:45


     





 


 Midodrine  10 mg  TID@0600,1200,1800


 PO  1/20/25 12:00


    1/23/25 12:12


10 MG


 


 Octreotide Acetate  100 mcg  TID


 SUBCUT  1/20/25 14:00


    1/23/25 05:22


100 MCG


 


 Methylprednisolone


 Sodium Succinate  40 mg  BID


 IV  1/21/25 22:00


    1/23/25 08:30


40 MG


 


 Docusate Sodium  100 mg  BIDPRN  PRN


 PO  1/21/25 16:15


     





 


 Cyanocobalamin  100 mcg  DAILY


 SUBCUT  1/24/25 10:00


     





 


 Multivit/Ca Carb/


 B Cmplx/FA/Prenat  1 tab  DAILY


 PO  1/24/25 10:00


     











Examination:  LUNGS:Normal, CVS:Normal, MSK:Normal


laboratory and microbiology


                                Laboratory Tests


1/21/25 15:30

















Test


 1/21/25


15:30 Range/Units


 


 


Serum Glucose 153 H   mg/dL








                                  Microbiology








 Date/Time


Source Procedure


Growth Status





 


 1/19/25 19:55


Nose MRSA Screen - Final


 Complete











Problem List/Assessment/Plan


Problem List/Assessment/Plan


Acute kidney injury likely hepatorenal syndrome


Hepatic encephalopathy


Hypotension


Nonalcoholic liver cirrhosis


Microcytic anemia


Hyponatremia due to excess H2O





Recommendations


Kidney function is improving


Increased urine output


Land catheter 


strict I&Os


Fluid restriction


kidney ultrasound reported within normal limits


Midodrine 10 mg p.o. t.i.d.


Octreotide 100 mcg subcu q.8 hours


GI consult


We will continue to follow


Plan discussed with:  Patient





Dietary Evaluation Review


Comments:  


Follow current diet regimen, consider protein restriction if pt's kdney  


function worsens and not schedule for dialysis.


Expected Outcomes/Goals:  


improved lab values, void uremic syndrome, gradual wt loss.











ANDIE FLORES MD        Jan 23, 2025 14:51

## 2025-01-23 NOTE — DVHPN2
Reviewed:  Care Plan, H&P, Labs, Medications, Previous Orders, Radiology


Changes from previous H/P or p:  No Changes


General:  Per HPI


Eyes:  No Pain, No Vision change, No Conjunctivae inflammation, No Eyelid 

inflammation, No Other, No Redness


ENT:  No Ear pain, No Ear discharge, No Nose pain, No Nose discharge, No Nose 

congestion, No Mouth pain, No Mouth swelling, No Throat pain, No Throat 

swelling, No Other


Cardiovascular:  Chest Pain; No Palpitations, No Orthopnea, No Paroxysmal Noc. 

Dyspnea, No Edema, No Lt Headedness, No Other


Respiratory:  No Cough, No Dry, No Shortness of breath, No SOB with excertion, 

No Wheezing, No Hemoptysis, No Pleuritic Pain, No Sputum, No Other


Gastrointestinal:  Nausea, Vomiting, Abdominal Pain; No Diarrhea, No 

Constipation, No Melena, No Hematochezia, No Other


Genitourinary:  No Dysuria, No Frequency, No Incontinence, No Hematuria, No 

Retention, No Other


Musculoskeletal:  No other, No neck pain, No shoulder pain, No arm pain, No back

pain, No hand pain, No leg pain, No foot pain


Skin:  No Rash, No Lesions, No Jaundice, No Bruising, No Other





Objective


Vitals





Vital Signs








  Date Time  Temp Pulse Resp B/P (MAP) Pulse Ox O2 Delivery O2 Flow Rate FiO2


 


1/23/25 12:16 98.2 61 18 114/42 (66) 99   





 98.2       


 


1/23/25 08:00      Room Air* 0 21








Intake/Output











                               Intake and Output 


 


 1/23/25





 07:00


 


Intake Total 150 ml


 


Balance 150 ml


 


 


 


Intake Oral 150 ml








Medications





                               Current Medications








 Medications  Dose


 Ordered  Sig/Kiran


 Route  Start Time


 Stop Time Status Last Admin


Dose Admin


 


 Acetaminophen/


 Hydrocodone Bitart  1 tab  Q4HP  PRN


 PO  1/19/25 09:45


    1/21/25 18:49


1 TAB


 


 Ondansetron HCl  4 mg  Q4HP  PRN


 IV  1/19/25 09:45


    1/19/25 10:14


4 MG


 


 Acetaminophen  650 mg  Q6HP  PRN


 PO  1/19/25 09:45


     





 


 Morphine Sulfate  2 mg  Q4HPRN  PRN


 IV  1/19/25 09:45


     





 


 Nitroglycerin  0.4 mg  Q5MINP  PRN


 SL  1/19/25 09:45


     





 


 Morphine Sulfate  2 mg  Q30M  PRN


 IV  1/19/25 09:45


     





 


 Midodrine  10 mg  TID@0600,1200,1800


 PO  1/20/25 12:00


    1/23/25 12:12


10 MG


 


 Octreotide Acetate  100 mcg  TID


 SUBCUT  1/20/25 14:00


    1/23/25 05:22


100 MCG


 


 Methylprednisolone


 Sodium Succinate  40 mg  BID


 IV  1/21/25 22:00


    1/23/25 08:30


40 MG


 


 Docusate Sodium  100 mg  BIDPRN  PRN


 PO  1/21/25 16:15


     





 


 Cyanocobalamin  100 mcg  DAILY


 SUBCUT  1/24/25 10:00


     





 


 Multivit/Ca Carb/


 B Cmplx/FA/Prenat  1 tab  DAILY


 PO  1/24/25 10:00


     














Laboratory Results


Laboratory Tests


1/21/25 15:30











Urinalysis








Test


 1/19/25


02:30 1/20/25


14:30


 


Urine Color


 Yellow


(Yellow) 





 


Urine Clarity Clear (Clear)   


 


Urine pH 5.5 (5.0-9.0)   


 


Urine Specific Gravity


 1.010


(1.001-1.035) 





 


Urine Protein


 Trace


(Negative)  H 





 


Urine Ketones


 Negative


(Negative) 





 


Urine Blood


 Negative /uL


(Negative) 





 


Urine Nitrite


 Negative


(Negative) 





 


Urine Bilirubin


 Negative


(Negative) 





 


Urine Urobilinogen


 Normal mg/dL


(Negative) 





 


Urine Leukocyte Esterase


 Negative /uL


(Negative) 





 


Urine RBC


 <1 /hpf (0 -


4) 





 


Urine WBC


 5 /hpf (0 - 5)


 





 


Urine Squamous Epithelial


Cells Few /hpf (<5)  


 





 


Urine Bacteria


 None seen /hpf


(None Seen) 





 


Urine Glucose


 Normal mg/dL


(Normal) 





 


Urine Osmolality  474 mOsm/kg  


 


Urine Creatinine


 


 69.04 mg/dL


(30.0-125.0)


 


Urine Protein/Creatinine Ratio  0.48  


 


Urine Sodium


 


 45 mmol/L


()


 


Urine Total Protein


 


 32.8 mg/dL


(1-14)  H








Microbiology





                                  Microbiology








 Date/Time


Source Procedure


Growth Status





 


 1/19/25 19:55


Nose MRSA Screen - Final


 Complete











Assessment/Plan


Assessment/Plan


This 70-year-old Mexican mainly speaking patient accompanied by daughter 

presents in the ED via EMS with a chief complaint of headaches.  The patient 

reports headaches associated with dizziness, chest burning pain, abdominal pain,

nausea, and vomiting started yesterday.  The patient denies syncope, 

palpitations, shortness of breath, or other acute symptoms.  The patient with 

past medical history of liver cirrhosis, hepatitis, anemia, and chronic kidney 

disease stage 3.





# acute renal failure





# Hyperbilirubinemia with transaminitis


# history of autoimmune hepatitis


# hx of Liver cirrhosis with banding


# anemia





# acute headaches


# chest pain





# nausea/vomiting











01/20/2025: still has abdominal pain. nephro is following for renal failure. 

chest pain improved


01/21/2025: discussed with pt regarding elevated liver enzymes. possible d/c in 

AM


01/22/2025: pt is being evaluated by GI. GI requested a liver biopsy


Plan discussed with:  Patient





Date of Service:  Jan 23, 2025


Billing Provider:  NEYMAR PEDRO DO


Common Visit Codes:  [id number] INP/OBS CARE(HIGH)











NEYMAR PEDRO DO                Jan 23, 2025 14:52

## 2025-01-23 NOTE — DVHPN2
Reviewed:  Care Plan, H&P, Labs, Medications, Previous Orders, Radiology


Changes from previous H/P or p:  No Changes


General:  Per HPI


Eyes:  No Pain, No Vision change, No Conjunctivae inflammation, No Eyelid 

inflammation, No Other, No Redness


ENT:  No Ear pain, No Ear discharge, No Nose pain, No Nose discharge, No Nose 

congestion, No Mouth pain, No Mouth swelling, No Throat pain, No Throat 

swelling, No Other


Cardiovascular:  Chest Pain; No Palpitations, No Orthopnea, No Paroxysmal Noc. 

Dyspnea, No Edema, No Lt Headedness, No Other


Respiratory:  No Cough, No Dry, No Shortness of breath, No SOB with excertion, 

No Wheezing, No Hemoptysis, No Pleuritic Pain, No Sputum, No Other


Gastrointestinal:  Nausea, Vomiting, Abdominal Pain; No Diarrhea, No 

Constipation, No Melena, No Hematochezia, No Other


Genitourinary:  No Dysuria, No Frequency, No Incontinence, No Hematuria, No 

Retention, No Other


Musculoskeletal:  No other, No neck pain, No shoulder pain, No arm pain, No back

pain, No hand pain, No leg pain, No foot pain


Skin:  No Rash, No Lesions, No Jaundice, No Bruising, No Other





Objective


Vitals





Vital Signs








  Date Time  Temp Pulse Resp B/P (MAP) Pulse Ox O2 Delivery O2 Flow Rate FiO2


 


1/23/25 12:16 98.2 61 18 114/42 (66) 99   





 98.2       


 


1/23/25 08:00      Room Air* 0 21








Intake/Output











                               Intake and Output 


 


 1/23/25





 07:00


 


Intake Total 150 ml


 


Balance 150 ml


 


 


 


Intake Oral 150 ml








Medications





                               Current Medications








 Medications  Dose


 Ordered  Sig/Kiran


 Route  Start Time


 Stop Time Status Last Admin


Dose Admin


 


 Acetaminophen/


 Hydrocodone Bitart  1 tab  Q4HP  PRN


 PO  1/19/25 09:45


    1/21/25 18:49


1 TAB


 


 Ondansetron HCl  4 mg  Q4HP  PRN


 IV  1/19/25 09:45


    1/19/25 10:14


4 MG


 


 Acetaminophen  650 mg  Q6HP  PRN


 PO  1/19/25 09:45


     





 


 Morphine Sulfate  2 mg  Q4HPRN  PRN


 IV  1/19/25 09:45


     





 


 Nitroglycerin  0.4 mg  Q5MINP  PRN


 SL  1/19/25 09:45


     





 


 Morphine Sulfate  2 mg  Q30M  PRN


 IV  1/19/25 09:45


     





 


 Midodrine  10 mg  TID@0600,1200,1800


 PO  1/20/25 12:00


    1/23/25 12:12


10 MG


 


 Octreotide Acetate  100 mcg  TID


 SUBCUT  1/20/25 14:00


    1/23/25 05:22


100 MCG


 


 Methylprednisolone


 Sodium Succinate  40 mg  BID


 IV  1/21/25 22:00


    1/23/25 08:30


40 MG


 


 Docusate Sodium  100 mg  BIDPRN  PRN


 PO  1/21/25 16:15


     





 


 Cyanocobalamin  100 mcg  DAILY


 SUBCUT  1/24/25 10:00


     





 


 Multivit/Ca Carb/


 B Cmplx/FA/Prenat  1 tab  DAILY


 PO  1/24/25 10:00


     














Laboratory Results


Laboratory Tests


1/21/25 15:30











Urinalysis








Test


 1/19/25


02:30 1/20/25


14:30


 


Urine Color


 Yellow


(Yellow) 





 


Urine Clarity Clear (Clear)   


 


Urine pH 5.5 (5.0-9.0)   


 


Urine Specific Gravity


 1.010


(1.001-1.035) 





 


Urine Protein


 Trace


(Negative)  H 





 


Urine Ketones


 Negative


(Negative) 





 


Urine Blood


 Negative /uL


(Negative) 





 


Urine Nitrite


 Negative


(Negative) 





 


Urine Bilirubin


 Negative


(Negative) 





 


Urine Urobilinogen


 Normal mg/dL


(Negative) 





 


Urine Leukocyte Esterase


 Negative /uL


(Negative) 





 


Urine RBC


 <1 /hpf (0 -


4) 





 


Urine WBC


 5 /hpf (0 - 5)


 





 


Urine Squamous Epithelial


Cells Few /hpf (<5)  


 





 


Urine Bacteria


 None seen /hpf


(None Seen) 





 


Urine Glucose


 Normal mg/dL


(Normal) 





 


Urine Osmolality  474 mOsm/kg  


 


Urine Creatinine


 


 69.04 mg/dL


(30.0-125.0)


 


Urine Protein/Creatinine Ratio  0.48  


 


Urine Sodium


 


 45 mmol/L


()


 


Urine Total Protein


 


 32.8 mg/dL


(1-14)  H








Microbiology





                                  Microbiology








 Date/Time


Source Procedure


Growth Status





 


 1/19/25 19:55


Nose MRSA Screen - Final


 Complete











Assessment/Plan


Assessment/Plan


This 70-year-old Mosoth[place] mainly speaking patient accompanied by daughter 

presents in the ED via EMS with a chief complaint of headaches.  The patient 

reports headaches associated with dizziness, chest burning pain, abdominal pain,

nausea, and vomiting started yesterday.  The patient denies syncope, 

palpitations, shortness of breath, or other acute symptoms.  The patient with 

past medical history of liver cirrhosis, hepatitis, anemia, and chronic kidney 

disease stage 3.





# acute renal failure





# Hyperbilirubinemia with transaminitis


# history of autoimmune hepatitis


# hx of Liver cirrhosis with banding


# anemia





# acute headaches


# chest pain





# nausea/vomiting











01/20/2025: still has abdominal pain. nephro is following for renal failure. 

chest pain improved


01/21/2025: discussed with pt regarding elevated liver enzymes. possible d/c in 

AM


01/22/2025: pt is being evaluated by GI. GI requested a liver biopsy


01/23/2025: pending liver biopys per GI recommendation


Plan discussed with:  Patient





Date of Service:  Jan 23, 2025


Billing Provider:  NEYMAR PEDRO DO


Common Visit Codes:  [id number] INP/OBS CARE(HIGH)











NEYMAR PEDRO DO                Jan 23, 2025 14:54

## 2025-01-23 NOTE — DVHPN2
Progress Note


Medical Necessity Reason


Pt with a Central, PICC or Fol:  No





Objective


vital signs





                                   Vital Sign








  Date Time  Temp Pulse Resp B/P (MAP) Pulse Ox O2 Delivery O2 Flow Rate FiO2


 


1/23/25 12:16 98.2 61 18 114/42 (66) 99   





 98.2       


 


1/23/25 08:00      Room Air* 0 21














                           Total Intake and Output   


 


 1/22/25 1/22/25 1/23/25





 15:00 23:00 07:00


 


Intake Total  150 ml 


 


Balance  150 ml 








medications





                               Current Medications








 Medications  Dose


 Ordered  Sig/Kiran


 Route  Start Time


 Stop Time Status Last Admin


Dose Admin


 


 Sodium Chloride  1,000 ml @ 


 75 mls/hr  [id number]


 IV  1/19/25 09:45


    1/22/25 21:36


75 MLS/HR


 


 Acetaminophen/


 Hydrocodone Bitart  1 tab  Q4HP  PRN


 PO  1/19/25 09:45


    1/21/25 18:49


1 TAB


 


 Ondansetron HCl  4 mg  Q4HP  PRN


 IV  1/19/25 09:45


    1/19/25 10:14


4 MG


 


 Acetaminophen  650 mg  Q6HP  PRN


 PO  1/19/25 09:45


     





 


 Morphine Sulfate  2 mg  Q4HPRN  PRN


 IV  1/19/25 09:45


     





 


 Nitroglycerin  0.4 mg  Q5MINP  PRN


 SL  1/19/25 09:45


     





 


 Morphine Sulfate  2 mg  Q30M  PRN


 IV  1/19/25 09:45


     





 


 Midodrine  10 mg  TID@0600,1200,1800


 PO  1/20/25 12:00


    1/23/25 12:12


10 MG


 


 Octreotide Acetate  100 mcg  TID


 SUBCUT  1/20/25 14:00


    1/23/25 05:22


100 MCG


 


 Methylprednisolone


 Sodium Succinate  40 mg  BID


 IV  1/21/25 22:00


    1/23/25 08:30


40 MG


 


 Docusate Sodium  100 mg  BIDPRN  PRN


 PO  1/21/25 16:15


     











laboratory and microbiology


                                Laboratory Tests


1/21/25 15:30

















Test


 1/21/25


15:30 Range/Units


 


 


Serum Glucose 153 H   mg/dL








                                  Microbiology








 Date/Time


Source Procedure


Growth Status





 


 1/19/25 19:55


Nose MRSA Screen - Final


 Complete











Problem List/Assessment/Plan


Problem List/Assessment/Plan


Reasons for consultation: ABDOMINAL PAIN





Hospitalization Summary: A 70-year-old Tunisian-speaking patient, accompanied by 

her daughter, presented to the ED with headaches, dizziness, chest burning pain,

abdominal pain, nausea, and vomiting that began the previous day. She has a 

medical history of liver cirrhosis, hepatitis, anemia, and stage 3 chronic 

kidney disease. She denies syncope, palpitations, shortness of breath, smoking, 

alcohol, or drug abuse, and has no relevant family or surgical history. 





GI Assessment: 


#1+ distal esophageal varices


#impending hepatorenal failure 


#She had a 2 cm sliding-type hiatal hernia with grade B erosive esophagitis


#Mild-to-moderate portal hypertension gastropathy


#?Upper GI bleed 


#Hepatitis A positive 


#Transaminitis 


#Cirrhosis of liver


#Jaundice


#Meld score is 22 points predictive of 19.6% three-month mortality 


#Constipation with last BM in 3 days. 


--------------------------------------------------------------------------------


#Macrocytic anemia 


#mild to moderate thrombocytopenia 


#acute renal failure 


#Nephrology consult 


#Indwelling urinary catheter 


#history of autoimmune hepatitis 


#hx of Liver cirrhosis with banding


#Vitamin D-25 


#Left ventricular 


#s/p cholecystectomy. 








GI Plan: 


#Medications: Hold Lovenox as pletlets dropping. hepatitis treat with IV 

prednisolone 40 mg bid and then switch to oral at discharge. Docusate 100 mg 

bid. 


#Labs: CMP trend, PIERO, antismooth muscle ab, Hepatitis A IgM. workup for 

autoimmune hepatitis. 


#Imaging/other work up: IR consult to check liver biopsy, RUQ US. 


#Procedure: Asim had a EGD in 1 year and colonoscopy in 2 years ago. 


#please scheduled follow up with GI as outpatient with Dr. ADITHYA Moeller. 





Thank you so much for the opportunity to consult on your patient. GI team will 

follow the patient.  In case of any questions or concerns please feel free to 

reach out. 


Case and action plan discussed with Dr. Jessica Moeller. Complex care planning 

needed total 45 minutes of detailed discussion. The patient and caregiver team 

agreed to the plan.





My Orders


My Orders





                         Orders - JAYDE HORN RESIDENT








Procedure Category Date Status





  Time 


 


* Radiologist Consult CONS 1/23/25 Transmitted





  12:50 

















JAYDE HORN RESIDENT          Jan 23, 2025 12:55

## 2025-01-23 NOTE — DVHPN2
Progress Note


Date Seen:  Jan 23, 2025


Resident Creating Document:  JAYDE HORN RESIDENT


Medical Necessity Reason


Pt with a Central, PICC or Fol:  No





Subjective


Review of Systems


Saw the patient at bedside, remains in the hospital, on IV steroid, as per 

daughter patient never had any liver biopsy.


Patient reports:  Feels better





Objective


vital signs





                                   Vital Sign








  Date Time  Temp Pulse Resp B/P (MAP) Pulse Ox O2 Delivery O2 Flow Rate FiO2


 


1/23/25 12:16 98.2 61 18 114/42 (66) 99   





 98.2       


 


1/23/25 08:00      Room Air* 0 21














                           Total Intake and Output   


 


 1/22/25 1/22/25 1/23/25





 15:00 23:00 07:00


 


Intake Total  150 ml 


 


Balance  150 ml 








medications





                               Current Medications








 Medications  Dose


 Ordered  Sig/Kiran


 Route  Start Time


 Stop Time Status Last Admin


Dose Admin


 


 Sodium Chloride  1,000 ml @ 


 75 mls/hr  [id number]


 IV  1/19/25 09:45


    1/22/25 21:36


75 MLS/HR


 


 Acetaminophen/


 Hydrocodone Bitart  1 tab  Q4HP  PRN


 PO  1/19/25 09:45


    1/21/25 18:49


1 TAB


 


 Ondansetron HCl  4 mg  Q4HP  PRN


 IV  1/19/25 09:45


    1/19/25 10:14


4 MG


 


 Acetaminophen  650 mg  Q6HP  PRN


 PO  1/19/25 09:45


     





 


 Morphine Sulfate  2 mg  Q4HPRN  PRN


 IV  1/19/25 09:45


     





 


 Nitroglycerin  0.4 mg  Q5MINP  PRN


 SL  1/19/25 09:45


     





 


 Morphine Sulfate  2 mg  Q30M  PRN


 IV  1/19/25 09:45


     





 


 Midodrine  10 mg  TID@0600,1200,1800


 PO  1/20/25 12:00


    1/23/25 12:12


10 MG


 


 Octreotide Acetate  100 mcg  TID


 SUBCUT  1/20/25 14:00


    1/23/25 05:22


100 MCG


 


 Methylprednisolone


 Sodium Succinate  40 mg  BID


 IV  1/21/25 22:00


    1/23/25 08:30


40 MG


 


 Docusate Sodium  100 mg  BIDPRN  PRN


 PO  1/21/25 16:15


     











Examination


GENERAL:Normal, HEENT:Normal, NECK:Normal, LUNGS:Normal, CVS:Normal, 

ABDOMEN:Normal, MSK:Normal, SKIN:Normal, NEURO:Normal, :Normal


laboratory and microbiology


                                Laboratory Tests


1/21/25 15:30

















Test


 1/21/25


15:30 Range/Units


 


 


Serum Glucose 153 H   mg/dL








                                  Microbiology








 Date/Time


Source Procedure


Growth Status





 


 1/19/25 19:55


Nose MRSA Screen - Final


 Complete








Labs and/or images reviewed:  Labs reviewed by me, Image(s) reviewed by me





Problem List/Assessment/Plan


Problem List/Assessment/Plan


Reasons for consultation: ABDOMINAL PAIN





Hospitalization Summary: A 70-year-old Russian-speaking patient, accompanied by 

her daughter, presented to the ED with headaches, dizziness, chest burning pain,

abdominal pain, nausea, and vomiting that began the previous day. She has a 

medical history of liver cirrhosis, hepatitis, anemia, and stage 3 chronic 

kidney disease. She denies syncope, palpitations, shortness of breath, smoking, 

alcohol, or drug abuse, and has no relevant family or surgical history.  Patient

has known liver cirrhosis nonalcoholic, the cause was never found but patient 

performed with the [facility], patient's daughter after discussion 

agreed to in-hospital IR guided liver biopsy.





GI Assessment: 


#1+ distal esophageal varices


#impending hepatorenal failure 


#She had a 2 cm sliding-type hiatal hernia with grade B erosive esophagitis


#Mild-to-moderate portal hypertension gastropathy


#?Upper GI bleed 


# previous Hepatitis A positive , but IgM negative


#Transaminitis 


#Cirrhosis of liver


#Jaundice


#Meld score is 22 points predictive of 19.6% three-month mortality 


#Constipation with last BM in 3 days. 


--------------------------------------------------------------------------------


#Macrocytic anemia 


#mild to moderate thrombocytopenia 


#acute renal failure 


#Nephrology consult 


#Indwelling urinary catheter 


#history of autoimmune hepatitis 


#hx of Liver cirrhosis with banding


#Vitamin D-25 


#Left ventricular 


#s/p cholecystectomy. 








GI Plan: 


#Medications: Hold Lovenox as pletlets dropping. hepatitis treat with IV 

prednisolone 40 mg bid and then switch to oral at discharge. Docusate 100 mg 

bid. 


#Labs: CMP trend, PIERO, antismooth muscle ab, workup for autoimmune hepatitis. 


#Imaging/other work up: IR consult to check liver biopsy to follow, the patient 

and the daughter agreed.


#Procedure: Asim had a EGD in 1 year and colonoscopy in 2 years ago.  No need

of further in-hospital procedure.


#please scheduled follow up with GI as outpatient with Dr. ADITHYA Moeller. 





Thank you so much for the opportunity to consult on your patient. GI team will 

sign off, patient can follow up outpatient with a liver biopsy result and 

continue Medrol taper at discharge.  In case of any questions or concerns please

feel free to reach out. 


Case and action plan discussed with Dr. Jessica Moeller. Complex care planning 

needed total 43 minutes of detailed discussion. The patient and caregiver team 

agreed to the plan.


Plan discussed with:  Patient, Daughter, Other (Primary team, RN)





My Orders


My Orders





                         Orders - JAYDE HORN








Procedure Category Date Status





  Time 


 


* Radiologist Consult CONS 1/23/25 Transmitted





  12:50 

















JAYDE HORN          Jan 23, 2025 13:00

## 2025-01-24 VITALS
SYSTOLIC BLOOD PRESSURE: 100 MMHG | HEART RATE: 55 BPM | OXYGEN SATURATION: 100 % | DIASTOLIC BLOOD PRESSURE: 47 MMHG | RESPIRATION RATE: 18 BRPM

## 2025-01-24 VITALS
HEART RATE: 53 BPM | DIASTOLIC BLOOD PRESSURE: 54 MMHG | RESPIRATION RATE: 15 BRPM | OXYGEN SATURATION: 97 % | SYSTOLIC BLOOD PRESSURE: 104 MMHG

## 2025-01-24 VITALS
SYSTOLIC BLOOD PRESSURE: 120 MMHG | RESPIRATION RATE: 14 BRPM | OXYGEN SATURATION: 98 % | HEART RATE: 58 BPM | TEMPERATURE: 97.8 F | DIASTOLIC BLOOD PRESSURE: 41 MMHG

## 2025-01-24 VITALS
DIASTOLIC BLOOD PRESSURE: 44 MMHG | OXYGEN SATURATION: 100 % | TEMPERATURE: 98 F | HEART RATE: 64 BPM | SYSTOLIC BLOOD PRESSURE: 95 MMHG | RESPIRATION RATE: 13 BRPM

## 2025-01-24 VITALS — OXYGEN SATURATION: 97 %

## 2025-01-24 VITALS
RESPIRATION RATE: 14 BRPM | HEART RATE: 60 BPM | OXYGEN SATURATION: 99 % | DIASTOLIC BLOOD PRESSURE: 45 MMHG | TEMPERATURE: 98.2 F | SYSTOLIC BLOOD PRESSURE: 101 MMHG

## 2025-01-24 VITALS
TEMPERATURE: 98 F | SYSTOLIC BLOOD PRESSURE: 106 MMHG | DIASTOLIC BLOOD PRESSURE: 90 MMHG | OXYGEN SATURATION: 99 % | HEART RATE: 58 BPM | RESPIRATION RATE: 12 BRPM

## 2025-01-24 VITALS
DIASTOLIC BLOOD PRESSURE: 59 MMHG | OXYGEN SATURATION: 100 % | HEART RATE: 50 BPM | RESPIRATION RATE: 14 BRPM | SYSTOLIC BLOOD PRESSURE: 116 MMHG

## 2025-01-24 VITALS
DIASTOLIC BLOOD PRESSURE: 55 MMHG | SYSTOLIC BLOOD PRESSURE: 119 MMHG | HEART RATE: 51 BPM | RESPIRATION RATE: 17 BRPM | OXYGEN SATURATION: 100 %

## 2025-01-24 VITALS — HEART RATE: 64 BPM | OXYGEN SATURATION: 97 %

## 2025-01-24 VITALS — HEART RATE: 59 BPM

## 2025-01-24 VITALS
HEART RATE: 54 BPM | OXYGEN SATURATION: 97 % | DIASTOLIC BLOOD PRESSURE: 54 MMHG | RESPIRATION RATE: 12 BRPM | SYSTOLIC BLOOD PRESSURE: 109 MMHG | TEMPERATURE: 97.2 F

## 2025-01-24 VITALS
SYSTOLIC BLOOD PRESSURE: 109 MMHG | OXYGEN SATURATION: 97 % | DIASTOLIC BLOOD PRESSURE: 55 MMHG | HEART RATE: 53 BPM | RESPIRATION RATE: 15 BRPM

## 2025-01-24 VITALS
DIASTOLIC BLOOD PRESSURE: 57 MMHG | RESPIRATION RATE: 14 BRPM | OXYGEN SATURATION: 97 % | HEART RATE: 51 BPM | SYSTOLIC BLOOD PRESSURE: 107 MMHG

## 2025-01-24 VITALS
HEART RATE: 57 BPM | DIASTOLIC BLOOD PRESSURE: 57 MMHG | SYSTOLIC BLOOD PRESSURE: 107 MMHG | OXYGEN SATURATION: 95 % | RESPIRATION RATE: 17 BRPM

## 2025-01-24 LAB
HCT VFR BLD AUTO: 31.1 % (ref 36–46)
HGB BLD-MCNC: 10.1 G/DL (ref 12.2–16.2)
INR PPP: 1 (ref 0.9–1.15)
MCH RBC QN AUTO: 35 PG (ref 28–32)
MCV RBC AUTO: 107.9 FL (ref 80–100)
NRBC BLD QL AUTO: 0.1 %
PROTHROMBIN TIME: 10.6 SEC (ref 9.3–11.8)

## 2025-01-24 PROCEDURE — 0FB03ZX EXCISION OF LIVER, PERCUTANEOUS APPROACH, DIAGNOSTIC: ICD-10-PCS | Performed by: STUDENT IN AN ORGANIZED HEALTH CARE EDUCATION/TRAINING PROGRAM

## 2025-01-24 RX ADMIN — MIDAZOLAM HYDROCHLORIDE ONE MG: 1 INJECTION, SOLUTION INTRAMUSCULAR; INTRAVENOUS at 11:17

## 2025-01-24 RX ADMIN — GELATIN ABSORBABLE SPONGE SIZE 50 ONE SPONGE: MISC at 08:44

## 2025-01-24 RX ADMIN — HEPARIN SODIUM ONE MLS/HR: 200 INJECTION, SOLUTION INTRAVENOUS at 10:03

## 2025-01-24 RX ADMIN — Medication SCH TAB: at 09:21

## 2025-01-24 RX ADMIN — IOHEXOL ONE MG: 350 INJECTION, SOLUTION INTRAVENOUS at 12:30

## 2025-01-24 RX ADMIN — IODIXANOL ONE MG: 320 INJECTION, SOLUTION INTRAVASCULAR at 11:43

## 2025-01-24 RX ADMIN — LIDOCAINE HYDROCHLORIDE ONE ML: 20 INJECTION, SOLUTION INFILTRATION; PERINEURAL at 11:17

## 2025-01-24 RX ADMIN — CYANOCOBALAMIN SCH MCG: 1000 INJECTION, SOLUTION INTRAMUSCULAR at 09:21

## 2025-01-24 RX ADMIN — FENTANYL CITRATE ONE MCG: 50 INJECTION, SOLUTION INTRAMUSCULAR; INTRAVENOUS at 11:16

## 2025-01-24 NOTE — DVHPN2
Progress Note


Date Seen:  Jan 24, 2025


Medical Necessity Reason


Pt with a Central, PICC or Fol:  No





Subjective


Patient reports:  No new complaints


Other Systems:  


Patient seen and examined by myself today in follow-up





Objective


vital signs





                                   Vital Sign








  Date Time  Temp Pulse Resp B/P (MAP) Pulse Ox O2 Delivery O2 Flow Rate FiO2


 


1/24/25 08:30 98.2 60 14 101/45 (63) 99   





 98.2       


 


1/24/25 08:00      Room Air* 0 21














                           Total Intake and Output   


 


 1/23/25 1/23/25 1/24/25





 15:00 23:00 07:00


 


Intake Total   450 ml


 


Output Total   0 ml


 


Balance   450 ml








medications





                               Current Medications








 Medications  Dose


 Ordered  Sig/Kiran


 Route  Start Time


 Stop Time Status Last Admin


Dose Admin


 


 Acetaminophen/


 Hydrocodone Bitart  1 tab  Q4HP  PRN


 PO  1/19/25 09:45


    1/21/25 18:49


1 TAB


 


 Ondansetron HCl  4 mg  Q4HP  PRN


 IV  1/19/25 09:45


    1/19/25 10:14


4 MG


 


 Acetaminophen  650 mg  Q6HP  PRN


 PO  1/19/25 09:45


     





 


 Morphine Sulfate  2 mg  Q4HPRN  PRN


 IV  1/19/25 09:45


     





 


 Nitroglycerin  0.4 mg  Q5MINP  PRN


 SL  1/19/25 09:45


     





 


 Morphine Sulfate  2 mg  Q30M  PRN


 IV  1/19/25 09:45


     





 


 Midodrine  10 mg  TID@0600,1200,1800


 PO  1/20/25 12:00


    1/24/25 05:20


10 MG


 


 Octreotide Acetate  100 mcg  TID


 SUBCUT  1/20/25 14:00


    1/24/25 05:20


100 MCG


 


 Methylprednisolone


 Sodium Succinate  40 mg  BID


 IV  1/21/25 22:00


    1/24/25 09:21


40 MG


 


 Docusate Sodium  100 mg  BIDPRN  PRN


 PO  1/21/25 16:15


     





 


 Cyanocobalamin  100 mcg  DAILY


 SUBCUT  1/24/25 10:00


    1/24/25 09:21


100 MCG


 


 Multivit/Ca Carb/


 B Cmplx/FA/Prenat  1 tab  DAILY


 PO  1/24/25 10:00


    1/24/25 09:21


1 TAB


 


 Melatonin  10 mg  HS


 PO  1/23/25 22:00


    1/23/25 21:54


10 MG








Examination:  LUNGS:Normal, CVS:Normal, MSK:Normal


laboratory and microbiology


                                Laboratory Tests


1/24/25 06:00








1/21/25 15:30

















Test


 1/21/25


15:30 Range/Units


 


 


Serum Glucose 153 H   mg/dL








                                  Microbiology








 Date/Time


Source Procedure


Growth Status





 


 1/19/25 19:55


Nose MRSA Screen - Final


 Complete











Problem List/Assessment/Plan


Problem List/Assessment/Plan


Acute kidney injury likely hepatorenal syndrome


Hepatic encephalopathy


Hypotension


Nonalcoholic liver cirrhosis


Microcytic anemia


Hyponatremia due to excess H2O





Recommendations


Kidney function is improving


No urine output charted


Land catheter 


strict I&Os


Fluid restriction


kidney ultrasound reported within normal limits


Midodrine 10 mg p.o. t.i.d.


Octreotide 100 mcg subcu q.8 hours


GI consult


We will continue to follow


Plan discussed with:  Patient, Daughter





My Orders


My Orders





                        Orders - ANDIE FLORES MD








Procedure Category Date Status





  Time 


 


Maintain Fluid MOHINI 1/23/25 In Process





Restrictions  14:50 


 


Comprehensive LAB 1/25/25 Verified





Metabolic Panel  04:00 


 


Magnesium LAB 1/25/25 Verified





  04:00 


 


Phosphorus LAB 1/25/25 Verified





  04:00 











Dietary Evaluation Review


Comments:  


Follow current diet regimen, consider protein restriction if pt's kdney  


function worsens and not schedule for dialysis.


Expected Outcomes/Goals:  


improved lab values, void uremic syndrome, gradual wt loss.











ANDIE FLORES MD        Jan 24, 2025 12:20

## 2025-01-24 NOTE — DVHPN2
Progress Note


Date Seen:  Jan 24, 2025


Resident Creating Document:  JAYDE HORN RESIDENT


Medical Necessity Reason


Pt with a Central, PICC or Fol:  No





Subjective


Review of Systems


Saw the patient at bedside, remains in the hospital, on IV steroid, as per 

daughter patient never had any liver biopsy.





Objective


vital signs





                                   Vital Sign








  Date Time  Temp Pulse Resp B/P (MAP) Pulse Ox O2 Delivery O2 Flow Rate FiO2


 


1/24/25 08:30 98.2 60 14 101/45 (63) 99   





 98.2       


 


1/24/25 08:00      Room Air* 0 21














                           Total Intake and Output   


 


 1/23/25 1/23/25 1/24/25





 15:00 23:00 07:00


 


Intake Total   450 ml


 


Output Total   0 ml


 


Balance   450 ml








medications





                               Current Medications








 Medications  Dose


 Ordered  Sig/Kiran


 Route  Start Time


 Stop Time Status Last Admin


Dose Admin


 


 Acetaminophen/


 Hydrocodone Bitart  1 tab  Q4HP  PRN


 PO  1/19/25 09:45


    1/21/25 18:49


1 TAB


 


 Ondansetron HCl  4 mg  Q4HP  PRN


 IV  1/19/25 09:45


    1/19/25 10:14


4 MG


 


 Acetaminophen  650 mg  Q6HP  PRN


 PO  1/19/25 09:45


     





 


 Morphine Sulfate  2 mg  Q4HPRN  PRN


 IV  1/19/25 09:45


     





 


 Nitroglycerin  0.4 mg  Q5MINP  PRN


 SL  1/19/25 09:45


     





 


 Morphine Sulfate  2 mg  Q30M  PRN


 IV  1/19/25 09:45


     





 


 Midodrine  10 mg  TID@0600,1200,1800


 PO  1/20/25 12:00


    1/24/25 05:20


10 MG


 


 Octreotide Acetate  100 mcg  TID


 SUBCUT  1/20/25 14:00


    1/24/25 05:20


100 MCG


 


 Methylprednisolone


 Sodium Succinate  40 mg  BID


 IV  1/21/25 22:00


    1/24/25 09:21


40 MG


 


 Docusate Sodium  100 mg  BIDPRN  PRN


 PO  1/21/25 16:15


     





 


 Cyanocobalamin  100 mcg  DAILY


 SUBCUT  1/24/25 10:00


    1/24/25 09:21


100 MCG


 


 Multivit/Ca Carb/


 B Cmplx/FA/Prenat  1 tab  DAILY


 PO  1/24/25 10:00


    1/24/25 09:21


1 TAB


 


 Melatonin  10 mg  HS


 PO  1/23/25 22:00


    1/23/25 21:54


10 MG








Examination


GENERAL APPEARANCE: Well developed, well nourished, alert and cooperative, and 

appears to be in no acute distress.  Improved.


HEENT:  WNL


NECK: Neck supple, non-tender without lymphadenopathy, masses or thyromegaly.


CARDIAC: Normal S1 and S2. No S3, S4 or murmurs. Rhythm is regular. There is no 

peripheral edema, cyanosis or pallor. Extremities are warm and well perfused. 

Capillary refill is less than 2 seconds. No carotid bruits.


LUNGS: Clear to auscultation and percussion without rales, rhonchi, wheezing or 

diminished breath sounds.


ABDOMEN: Positive bowel sounds. Soft, nondistended, nontender. No guarding or 

rebound. No masses.  Mild epigastric tenderness. 


MSK:  WNL


NEUROLOGICAL: CN II-XII intact. Strength and sensation symmetric and intact 

throughout. Reflexes 2+ throughout. Cerebellar testing normal.


SKIN: Skin normal color, texture and turgor with no lesions or eruptions.


PSYCHIATRIC: Stable


laboratory and microbiology


                                Laboratory Tests


1/24/25 06:00








1/21/25 15:30

















Test


 1/21/25


15:30 Range/Units


 


 


Serum Glucose 153 H   mg/dL








                                  Microbiology








 Date/Time


Source Procedure


Growth Status





 


 1/19/25 19:55


Nose MRSA Screen - Final


 Complete








Labs and/or images reviewed:  Labs reviewed by me, Image(s) reviewed by me





Problem List/Assessment/Plan


Problem List/Assessment/Plan


Reasons for consultation: ABDOMINAL PAIN





Hospitalization Summary: A 70-year-old Martiniquais-speaking patient, accompanied by 

her daughter, presented to the ED with headaches, dizziness, chest burning pain,

abdominal pain, nausea, and vomiting that began the previous day. She has a 

medical history of liver cirrhosis, hepatitis, anemia, and stage 3 chronic 

kidney disease. She denies syncope, palpitations, shortness of breath, smoking, 

alcohol, or drug abuse, and has no relevant family or surgical history.  Patient

has known liver cirrhosis nonalcoholic, the cause was never found but patient 

performed with the [facility], patient's daughter after discussion 

agreed to in-hospital IR guided liver biopsy. Post procedure the patient is 

stable.  





GI Assessment: 


#1+ distal esophageal varices


#impending hepatorenal failure 


#She had a 2 cm sliding-type hiatal hernia with grade B erosive esophagitis


#Mild-to-moderate portal hypertension gastropathy


#?Upper GI bleed 


# previous Hepatitis A positive , but IgM negative


#Transaminitis 


#Cirrhosis of liver


#Jaundice


#Meld score is 22 points predictive of 19.6% three-month mortality 


#Constipation with last BM in 3 days. 


--------------------------------------------------------------------------------


#Macrocytic anemia 


#mild to moderate thrombocytopenia 


#acute renal failure 


#Nephrology consult 


#Indwelling urinary catheter 


#history of autoimmune hepatitis 


#hx of Liver cirrhosis with banding


#Vitamin D-25 


#Left ventricular 


#s/p cholecystectomy. 





GI Plan: 


#Medications: Hold Lovenox as pletlets dropping. hepatitis treat with IV 

prednisolone 40 mg bid and then switch to oral at discharge. Docusate 100 mg 

bid. 


#Labs: CMP trend, PIERO, antismooth muscle ab, workup for autoimmune hepatitis. 


#Imaging/other work up: IR guided biopsy done, follow the histology. 


#Procedure: Asim had a EGD in 1 year and colonoscopy in 2 years ago.  No need

of further in-hospital procedure.


#please scheduled follow up with GI as outpatient with Dr. ADITHYA Moeller. 





Thank you so much for the opportunity to consult on your patient. GI team will 

sign off, patient can follow up outpatient with a liver biopsy result and 

continue Medrol taper at discharge.  In case of any questions or concerns please

feel free to reach out. 


Case and action plan discussed with Dr. Jessica Moeller. Complex care planning 

needed total 43 minutes of detailed discussion. The patient and caregiver team 

agreed to the plan.


Plan discussed with:  Patient, Other





My Orders


My Orders





                         Orders - JAYDE HORN RESIDENT








Procedure Category Date Status





  Time 


 


Cyanocobalamin PHA 1/24/25 In Process





Injection (Vitamin  10:00 


 


B-Complex W/ C & PHA 1/24/25 In Process





Folic Tablet  10:00 


 


* Radiologist Consult CONS 1/23/25 Transmitted





  18:30 











Dietary Evaluation Review


Comments:  


Follow current diet regimen, consider protein restriction if pt's kdney  


function worsens and not schedule for dialysis.


Expected Outcomes/Goals:  


improved lab values, void uremic syndrome, gradual wt loss.











JAYDE HORN RESIDENT          Jan 24, 2025 11:28

## 2025-01-24 NOTE — DVH
XY Transjugintrahepatic, 



HISTORY: TRANS JUGULAR HEP. BX for cirrhosis with suspected autoimmune hepatitis and ascites present.




PROCEDURE: Informed consent was obtained. The patient was placed in supine position on the fluoroscop
ic table. The right neck site was prepped with chlorhexidine which was allowed to dry and draped in s
terile fashion. Time out was performed. A micropuncture set was used to access the internal jugular v
ein under real-time ultrasound guidance, and an image documenting patency was recorded to PACS. Follo
wing serial dilation, a 8 Nauruan vascular sheath was placed. A 5 Nauruan MPA catheter was used to jeannine
ct the right hepatic vein using small amounts of hand contrast injections for visualization in the AP
 and lateral projections.  The catheter was then exchanged to a 7 [place]auruan transjugular biopsy set, and
 1 core biopsies were obtained, placed in formalin solution, and sent to pathology. The catheter and 
sheath were removed and hemostasis achieved with manual pressure No immediate complication was identi
fied.



DAP 2450.08



FLUOROSCOPY TIME: 16.6 minutes.



CONTRAST USED: 50 mL .



SEDATION: Dr. QUIQUE Blanco was personally responsible for the administration of moderate sedation during 
the procedure performed, including the use of an independent trained observer who had no other duties
 during the procedure. The drugs utilized were IV fentanyl and versed (see nursing log for details). 
The total time of supervision by the attending physician was approximately 75 minutes.



FINDINGS:



1. Widely patent right internal jugular vein. Patent right hepatic vein.



IMPRESSION: 



Transjugular liver biopsy with a core sample obtained and send to pathology for analysis in formalin.




Electronically Signed by: Wilver Blanco at 01/24/2025 14:34:14 PM

## 2025-01-24 NOTE — DVH
XY Transjugintrahepatic, 



HISTORY: TRANS JUGULAR HEP. BX for cirrhosis with suspected autoimmune hepatitis and ascites present.




PROCEDURE: Informed consent was obtained. The patient was placed in supine position on the fluoroscop
ic table. The right neck site was prepped with chlorhexidine which was allowed to dry and draped in s
terile fashion. Time out was performed. A micropuncture set was used to access the internal jugular v
ein under real-time ultrasound guidance, and an image documenting patency was recorded to PACS. Follo
wing serial dilation, a 8 Sudanese vascular sheath was placed. A 5 Sudanese MPA catheter was used to jeannine
ct the right hepatic vein using small amounts of hand contrast injections for visualization in the AP
 and lateral projections.  The catheter was then exchanged to a 7 Sudanese transjugular biopsy set, and
 1 core biopsies were obtained, placed in formalin solution, and sent to pathology. The catheter and 
sheath were removed and hemostasis achieved with manual pressure No immediate complication was identi
fied.



DAP 2450.08



FLUOROSCOPY TIME: 16.6 minutes.



CONTRAST USED: 50 mL .



SEDATION: Dr. QUIQUE Blanco was personally responsible for the administration of moderate sedation during 
the procedure performed, including the use of an independent trained observer who had no other duties
 during the procedure. The drugs utilized were IV fentanyl and versed (see nursing log for details). 
The total time of supervision by the attending physician was approximately 75 minutes.



FINDINGS:



1. Widely patent right internal jugular vein. Patent right hepatic vein.



IMPRESSION: 



Transjugular liver biopsy with a core sample obtained and send to pathology for analysis in formalin.




Electronically Signed by: Wilver Blanco at 01/24/2025 14:34:14 PM

## 2025-01-24 NOTE — DVHDS2
Discharge Summary


Date of Admission


Jan 19, 2025 at 09:40





Date of Discharge:


Jan 24, 2025





Labs/Diagnostic Data:





                               Laboratory Results








Test


 1/24/25


06:00 1/22/25


05:49 1/21/25


16:40 1/21/25


15:30


 


White Blood Count


 12.1 10^3/uL


(4.4-10.8) 


 


 





 


Red Blood Count


 2.89 10^6/uL


(4.0-5.20) 


 


 





 


Hemoglobin


 10.1 g/dL


(12.2-16.2) 


 


 





 


Hematocrit


 31.1 %


(36.0-46.0) 


 


 





 


Mean Corpuscular Volume


 107.9 fL


(80.0-100.0) 


 


 





 


Mean Corpuscular Hemoglobin


 35.0 pg


(28.0-32.0) 


 


 





 


Mean Corpuscular Hemoglobin


Concent 32.5 g/dL


(32.0-36.0) 


 


 





 


Red Cell Distribution Width


 19.8 %


(11.8-14.3) 


 


 





 


Platelet Count


 77 10^3/uL


(140-450) 


 


 





 


Mean Platelet Volume


 8.4 fL


(6.9-10.8) 


 


 





 


Neutrophils (%) (Auto)


 95.5 %


(37.0-80.0) 


 


 





 


Lymphocytes (%) (Auto)


 1.8 %


(10.0-50.0) 


 


 





 


Monocytes (%) (Auto)


 2.6 %


(0.0-12.0) 


 


 





 


Eosinophils (%) (Auto)


 0.0 %


(0.0-7.0) 


 


 





 


Basophils (%) (Auto)


 0.1 %


(0.0-2.0) 


 


 





 


Neutrophils # (Auto)


 11.5 10 ^3/uL


(1.6-8.6) 


 


 





 


Lymphocytes # (Auto)


 0.2 10 ^3/uL


(0.4-5.4) 


 


 





 


Monocytes # (Auto)


 0.3 10 ^3/uL


(0-1.3) 


 


 





 


Eosinophils # (Auto)


 0 10 ^3/uL


(0-0.8) 


 


 





 


Basophils # (Auto)


 0 10 ^3/uL


(0-0.2) 


 


 





 


Nucleated Red Blood Cells 0.1 %    


 


Platelet Estimate Decreased    


 


Macrocytosis Moderate    


 


Prothrombin Time


 10.6 sec


(9.3-11.8) 


 


 





 


Prothrombin Time INR


 1.00


(0.9-1.15) 


 


 





 


Hepatitis A IgM Antibody  Negative   


 


Free Thyroxine (T4) Calculated


 


 


 0.90 ng/dL


(0.89-1.76) 





 


Free Triiodothyronine (T3)


pg/mL 


 


 1.75 pg/mL


(2.3-4.2) 





 


Anti-Nuclear Antibody Comment   Comment (.)  


 


MYRIAM-1 Antibody


 


 


 <0.2 AI


(0.0-0.9) 





 


SS-A/Ro Antibody


 


 


 <0.2 AI


(0.0-0.9) 





 


SS-B/La Antibody


 


 


 <0.2 AI


(0.0-0.9) 





 


Sm Antibody


 


 


 <0.2 AI


(0.0-0.9) 





 


RNP Antibody


 


 


 <0.2 AI


(0.0-0.9) 





 


Scl-70 (Scleroderma) Antibody


 


 


 <0.2 AI


(0.0-0.9) 





 


Anti-Double Strand DNA


Antibody 


 


 <1 IU/mL (0-9) 


 





 


Chromatin Antibody


 


 


 0.4 AI


(0.0-0.9) 





 


Centromere B Antibody


 


 


 >8.0 AI


(0.0-0.9) 





 


Large Platelets    Few 


 


Anisocytosis (manual)    Slight 


 


Sodium Level


 


 


 


 135 mmol/L


(136-145)


 


Potassium Level


 


 


 


 4.0 mmol/L


(3.5-5.1)


 


Chloride Level


 


 


 


 103 mmol/L


()


 


Carbon Dioxide Level


 


 


 


 24 mmol/L


(20-31)


 


Anion Gap    8 (5-15) 


 


Blood Urea Nitrogen


 


 


 


 37 mg/dL


(9-23)


 


Creatinine


 


 


 


 1.70 mg/dL


(0.550-1.02)


 


Glomerular Filtration Rate


Calc 


 


 


 32 mL/min


(>90)


 


BUN/Creatinine Ratio


 


 


 


 21.8


(10.0-20.0)


 


Serum Glucose


 


 


 


 153 mg/dL


()


 


Calcium Level


 


 


 


 9.6 mg/dL


(8.7-10.4)


 


Total Bilirubin


 


 


 


 3.3 mg/dL


(0.2-1.0)


 


Aspartate Amino Transferase


(AST) 


 


 


 109 U/L


(13-40)


 


Alanine Aminotransferase (ALT)    169 U/L (7-40) 


 


Alkaline Phosphatase


 


 


 


 136 U/L


()


 


Total Protein


 


 


 


 6.1 g/dL


(5.7-8.2)


 


Albumin


 


 


 


 3.9 g/dL


(3.2-4.8)


 


Test


 1/20/25


14:30 1/20/25


05:38 1/19/25


10:09 1/19/25


03:32


 


Urine Osmolality 474 mOsm/kg    


 


Urine Creatinine


 69.04 mg/dL


(30.0-125.0) 


 


 





 


Urine Protein/Creatinine Ratio 0.48    


 


Urine Sodium


 45 mmol/L


() 


 


 





 


Urine Total Protein


 32.8 mg/dL


(1-14) 


 


 





 


Phosphorus Level


 


 5.1 mg/dL


(2.4-5.1) 


 





 


Magnesium Level


 


 3.0 mg/dL


(1.6-2.6) 


 





 


Vitamin D 25-Hydroxy


 


 14.3 ng/mL


(30.0-100) 


 





 


Activated Partial


Thromboplast Time 


 


 < 20.0 SEC


(24.5-34.5) 





 


Ammonia


 


 


 29 umol/L


(11-32) 





 


Troponin I High Sensitivity


 


 


 


 21 ng/L


(</=34)


 


Triglycerides Level


 


 


 


 180 mg/dL (<


150)


 


Cholesterol Level


 


 


 


 185 mg/dL (<


200)


 


LDL Cholesterol


 


 


 


 121 mg/dL (<


100)


 


HDL Cholesterol


 


 


 


 34 mg/dL


(40-59)


 


Thyroid Stimulating Hormone


(TSH) 


 


 


 0.29 uIU/mL


(0.55-4.78)


 


Test


 1/19/25


02:30 1/19/25


00:26 


 





 


Urine Color


 Yellow


(Yellow) 


 


 





 


Urine Clarity Clear (Clear)    


 


Urine pH 5.5 (5.0-9.0)    


 


Urine Specific Gravity


 1.010


(1.001-1.035) 


 


 





 


Urine Protein


 Trace


(Negative) 


 


 





 


Urine Ketones


 Negative


(Negative) 


 


 





 


Urine Blood


 Negative /uL


(Negative) 


 


 





 


Urine Nitrite


 Negative


(Negative) 


 


 





 


Urine Bilirubin


 Negative


(Negative) 


 


 





 


Urine Urobilinogen


 Normal mg/dL


(Negative) 


 


 





 


Urine Leukocyte Esterase


 Negative /uL


(Negative) 


 


 





 


Urine RBC


 <1 /hpf (0 -


4) 


 


 





 


Urine WBC 5 /hpf (0 - 5)    


 


Urine Squamous Epithelial


Cells Few /hpf (<5) 


 


 


 





 


Urine Bacteria


 None seen /hpf


(None Seen) 


 


 





 


Urine Glucose


 Normal mg/dL


(Normal) 


 


 





 


Tear Drop Cells  Few   


 


Stomatocytes  Few   


 


Lactic Acid Level


 


 1.5 mmol/L


(0.4-2.0) 


 





 


B-Type Natriuretic Peptide


 


 157.54 pg/mL


(0-100) 


 





 


Lipase  34 U/L (12-53)   


 


Hepatitis A Antibody Total


 


 Positive


(Negative) 


 





 


Hepatitis B Surface Antigen


 


 Negative


(Negative) 


 





 


Hepatitis B Surface Antibody


 


 Negative


(Negative) 


 





 


Hepatitis B Core Total


Antibody 


 Negative


(Negative) 


 





 


Hepatitis C Antibody


 


 Negative


(Negative) 


 








                             Other Laboratory Tests


1/24/25 06:00








1/21/25 15:30











Final Diagnosis/Problems List





df





Discharge Disposition:


Home





Discharge Instruct/Medications


Diet:  Cardiac 2g Na,low cholest


Activity:  No Restrictions, As Tolerated


Discharge Statement:


"Patient was advised to return to the ER or call 911 if any headaches, 

dizziness, shortness of breath, chest pain, abdominal pain, bleeding, fevers, or

worsening of medical condition.





Patient was counseled about treatment plan, medications, possible side effects, 

patientverbalized understanding. All questions were answered to the best of my 

ability.





This discharge took greater then 30 minutes in planning, reviewing 

documentation, counseling the patient, and discussing with other team members."





ASSESSMENT








Date of Service:  Jan 24, 2025


Billing Provider:  NEYMAR PEDRO DO


Common Visit Codes:  09466-QAL/OBS DISCH DAY >30min











NEYMAR PEDRO DO                Jan 24, 2025 12:40

## 2025-01-25 VITALS
OXYGEN SATURATION: 94 % | DIASTOLIC BLOOD PRESSURE: 42 MMHG | HEART RATE: 77 BPM | RESPIRATION RATE: 17 BRPM | TEMPERATURE: 98.7 F | SYSTOLIC BLOOD PRESSURE: 108 MMHG

## 2025-01-25 VITALS
DIASTOLIC BLOOD PRESSURE: 57 MMHG | OXYGEN SATURATION: 97 % | RESPIRATION RATE: 18 BRPM | SYSTOLIC BLOOD PRESSURE: 115 MMHG | TEMPERATURE: 97.8 F | HEART RATE: 65 BPM

## 2025-01-25 VITALS
OXYGEN SATURATION: 95 % | SYSTOLIC BLOOD PRESSURE: 107 MMHG | RESPIRATION RATE: 17 BRPM | HEART RATE: 53 BPM | DIASTOLIC BLOOD PRESSURE: 43 MMHG | TEMPERATURE: 98.1 F

## 2025-01-25 VITALS
RESPIRATION RATE: 14 BRPM | DIASTOLIC BLOOD PRESSURE: 45 MMHG | OXYGEN SATURATION: 96 % | HEART RATE: 80 BPM | SYSTOLIC BLOOD PRESSURE: 95 MMHG | TEMPERATURE: 98.5 F

## 2025-01-25 VITALS — OXYGEN SATURATION: 94 % | HEART RATE: 77 BPM | RESPIRATION RATE: 17 BRPM

## 2025-01-25 VITALS
TEMPERATURE: 98 F | OXYGEN SATURATION: 96 % | RESPIRATION RATE: 20 BRPM | SYSTOLIC BLOOD PRESSURE: 105 MMHG | HEART RATE: 61 BPM | DIASTOLIC BLOOD PRESSURE: 64 MMHG

## 2025-01-25 VITALS — OXYGEN SATURATION: 94 % | HEART RATE: 56 BPM

## 2025-01-25 VITALS
HEART RATE: 68 BPM | SYSTOLIC BLOOD PRESSURE: 100 MMHG | OXYGEN SATURATION: 95 % | DIASTOLIC BLOOD PRESSURE: 52 MMHG | RESPIRATION RATE: 20 BRPM | TEMPERATURE: 98.4 F

## 2025-01-25 VITALS — HEART RATE: 67 BPM

## 2025-01-25 LAB
ALBUMIN SERPL-MCNC: 3.5 G/DL (ref 3.2–4.8)
ALP SERPL-CCNC: 134 U/L (ref 46–116)
ALT SERPL-CCNC: 127 U/L (ref 7–40)
ANION GAP SERPL CALCULATED.3IONS-SCNC: 8 MMOL/L (ref 5–15)
BILIRUB SERPL-MCNC: 3.1 MG/DL (ref 0.2–1)
BUN SERPL-MCNC: 31 MG/DL (ref 9–23)
BUN/CREAT SERPL: 25.4 (ref 10–20)
CALCIUM SERPL-MCNC: 9.9 MG/DL (ref 8.7–10.4)
CHLORIDE SERPL-SCNC: 107 MMOL/L (ref 98–107)
CO2 SERPL-SCNC: 21 MMOL/L (ref 20–31)
GLUCOSE SERPL-MCNC: 120 MG/DL (ref 74–106)
MAGNESIUM SERPL-MCNC: 2.4 MG/DL (ref 1.6–2.6)
POTASSIUM SERPL-SCNC: 4.2 MMOL/L (ref 3.5–5.1)
PROT SERPL-MCNC: 5.7 G/DL (ref 5.7–8.2)
SODIUM SERPL-SCNC: 136 MMOL/L (ref 136–145)

## 2025-01-25 NOTE — DVHPN2
Progress Note


Date Seen:  Jan 25, 2025


Medical Necessity Reason


Pt with a Central, PICC or Fol:  No





Subjective


Patient reports:  No new complaints


Other Systems:  


Patient seen and examined by myself today in follow-up





Objective


vital signs





                                   Vital Sign








  Date Time  Temp Pulse Resp B/P (MAP) Pulse Ox O2 Delivery O2 Flow Rate FiO2


 


1/25/25 08:42 98.7 77 17 108/42 (64) 94   





 98.7       


 


1/25/25 08:10      Room Air* 0 21














                           Total Intake and Output   


 


 1/24/25 1/24/25 1/25/25





 15:00 23:00 07:00


 


Intake Total  700 ml 


 


Balance  700 ml 








medications





                               Current Medications








 Medications  Dose


 Ordered  Sig/Kiran


 Route  Start Time


 Stop Time Status Last Admin


Dose Admin


 


 Acetaminophen/


 Hydrocodone Bitart  1 tab  Q4HP  PRN


 PO  1/19/25 09:45


    1/21/25 18:49


1 TAB


 


 Ondansetron HCl  4 mg  Q4HP  PRN


 IV  1/19/25 09:45


    1/19/25 10:14


4 MG


 


 Acetaminophen  650 mg  Q6HP  PRN


 PO  1/19/25 09:45


     





 


 Morphine Sulfate  2 mg  Q4HPRN  PRN


 IV  1/19/25 09:45


     





 


 Nitroglycerin  0.4 mg  Q5MINP  PRN


 SL  1/19/25 09:45


     





 


 Morphine Sulfate  2 mg  Q30M  PRN


 IV  1/19/25 09:45


     





 


 Midodrine  10 mg  TID@0600,1200,1800


 PO  1/20/25 12:00


    1/25/25 05:11


10 MG


 


 Octreotide Acetate  100 mcg  TID


 SUBCUT  1/20/25 14:00


    1/25/25 05:12


100 MCG


 


 Methylprednisolone


 Sodium Succinate  40 mg  BID


 IV  1/21/25 22:00


    1/25/25 10:30


40 MG


 


 Docusate Sodium  100 mg  BIDPRN  PRN


 PO  1/21/25 16:15


     





 


 Cyanocobalamin  100 mcg  DAILY


 SUBCUT  1/24/25 10:00


    1/25/25 10:36


100 MCG


 


 Multivit/Ca Carb/


 B Cmplx/FA/Prenat  1 tab  DAILY


 PO  1/24/25 10:00


    1/25/25 10:34


1 TAB


 


 Melatonin  10 mg  HS


 PO  1/23/25 22:00


    1/24/25 21:16


10 MG








Examination:  LUNGS:Normal, CVS:Normal, MSK:Normal


laboratory and microbiology


                                Laboratory Tests


1/25/25 06:12








1/24/25 06:00

















Test


 1/25/25


06:12 Range/Units


 


 


Serum Glucose 120 H   mg/dL








                                  Microbiology








 Date/Time


Source Procedure


Growth Status





 


 1/19/25 19:55


Nose MRSA Screen - Final


 Complete











Problem List/Assessment/Plan


Problem List/Assessment/Plan


Acute kidney injury likely hepatorenal syndrome


Hepatic encephalopathy


Hypotension


Nonalcoholic liver cirrhosis


Microcytic anemia


Hyponatremia due to excess H2O





Recommendations


Kidney function continues to improve


No urine output charted


Land catheter 


strict I&Os


Fluid restriction


kidney ultrasound reported within normal limits


Midodrine 10 mg p.o. t.i.d.


Octreotide 100 mcg subcu q.8 hours


GI consult


We will continue to follow


Plan discussed with:  Patient, Daughter





Dietary Evaluation Review


Comments:  


Follow current diet regimen, consider protein restriction if pt's kdney  


function worsens and not schedule for dialysis.


Expected Outcomes/Goals:  


improved lab values, void uremic syndrome, gradual wt loss.











ANDIE FLORES MD        Jan 25, 2025 11:40

## 2025-01-26 VITALS
SYSTOLIC BLOOD PRESSURE: 92 MMHG | OXYGEN SATURATION: 98 % | TEMPERATURE: 98.6 F | HEART RATE: 50 BPM | RESPIRATION RATE: 16 BRPM | DIASTOLIC BLOOD PRESSURE: 41 MMHG

## 2025-01-26 VITALS
RESPIRATION RATE: 16 BRPM | HEART RATE: 49 BPM | DIASTOLIC BLOOD PRESSURE: 40 MMHG | SYSTOLIC BLOOD PRESSURE: 95 MMHG | OXYGEN SATURATION: 97 % | TEMPERATURE: 97.8 F

## 2025-01-26 VITALS
OXYGEN SATURATION: 97 % | SYSTOLIC BLOOD PRESSURE: 88 MMHG | RESPIRATION RATE: 18 BRPM | TEMPERATURE: 97.9 F | DIASTOLIC BLOOD PRESSURE: 33 MMHG | HEART RATE: 61 BPM

## 2025-01-26 VITALS
TEMPERATURE: 97.8 F | HEART RATE: 56 BPM | RESPIRATION RATE: 16 BRPM | DIASTOLIC BLOOD PRESSURE: 40 MMHG | SYSTOLIC BLOOD PRESSURE: 95 MMHG | OXYGEN SATURATION: 97 %

## 2025-01-26 VITALS
SYSTOLIC BLOOD PRESSURE: 99 MMHG | TEMPERATURE: 97.7 F | DIASTOLIC BLOOD PRESSURE: 44 MMHG | HEART RATE: 52 BPM | RESPIRATION RATE: 17 BRPM | OXYGEN SATURATION: 96 %

## 2025-01-26 VITALS
TEMPERATURE: 98.2 F | RESPIRATION RATE: 17 BRPM | DIASTOLIC BLOOD PRESSURE: 47 MMHG | OXYGEN SATURATION: 98 % | HEART RATE: 57 BPM | SYSTOLIC BLOOD PRESSURE: 98 MMHG

## 2025-01-26 VITALS — OXYGEN SATURATION: 95 % | HEART RATE: 49 BPM | RESPIRATION RATE: 18 BRPM

## 2025-01-26 VITALS — HEART RATE: 48 BPM

## 2025-01-26 NOTE — DVHPN2
Reviewed:  Care Plan, H&P, Labs, Medications, Previous Orders, Radiology


Changes from previous H/P or p:  No Changes


General:  Per HPI


Eyes:  No Pain, No Vision change, No Conjunctivae inflammation, No Eyelid 

inflammation, No Other, No Redness


ENT:  No Ear pain, No Ear discharge, No Nose pain, No Nose discharge, No Nose 

congestion, No Mouth pain, No Mouth swelling, No Throat pain, No Throat 

swelling, No Other


Cardiovascular:  Chest Pain


Respiratory:  No Cough, No Dry, No Shortness of breath, No SOB with excertion, 

No Wheezing, No Hemoptysis, No Pleuritic Pain, No Sputum, No Other


Gastrointestinal:  Nausea, Vomiting, Abdominal Pain


Genitourinary:  No Dysuria, No Frequency, No Incontinence, No Hematuria, No 

Retention, No Other


Musculoskeletal:  No other, No neck pain, No shoulder pain, No arm pain, No back

pain, No hand pain, No leg pain, No foot pain


Skin:  No Rash, No Lesions, No Jaundice, No Bruising, No Other





Objective


Vitals





Vital Signs








  Date Time  Temp Pulse Resp B/P (MAP) Pulse Ox O2 Delivery O2 Flow Rate FiO2


 


1/26/25 13:00 97.8 56 16 95/40 (58) 97   





 97.8       


 


1/26/25 07:55      Room Air* 0 21








Intake/Output











                               Intake and Output 


 


 1/26/25





 07:00


 


Intake Total 285 ml


 


Balance 285 ml


 


 


 


Intake Oral 285 ml


 


# Voids 2








General Appearance:  Alert, Oriented X3, Cooperative


Cardiovascular:  Regular rate, Normal S1, Normal S2


Abdomen:  Normal bowel sounds, Soft


Medications





                               Current Medications








 Medications  Dose


 Ordered  Sig/Kiran


 Route  Start Time


 Stop Time Status Last Admin


Dose Admin


 


 Acetaminophen/


 Hydrocodone Bitart  1 tab  Q4HP  PRN


 PO  1/19/25 09:45


    1/26/25 08:01


1 TAB


 


 Ondansetron HCl  4 mg  Q4HP  PRN


 IV  1/19/25 09:45


    1/19/25 10:14


4 MG


 


 Acetaminophen  650 mg  Q6HP  PRN


 PO  1/19/25 09:45


     





 


 Morphine Sulfate  2 mg  Q4HPRN  PRN


 IV  1/19/25 09:45


     





 


 Nitroglycerin  0.4 mg  Q5MINP  PRN


 SL  1/19/25 09:45


     





 


 Morphine Sulfate  2 mg  Q30M  PRN


 IV  1/19/25 09:45


     





 


 Midodrine  10 mg  TID@0600,1200,1800


 PO  1/20/25 12:00


    1/26/25 11:54


10 MG


 


 Octreotide Acetate  100 mcg  TID


 SUBCUT  1/20/25 14:00


    1/26/25 14:14


100 MCG


 


 Methylprednisolone


 Sodium Succinate  40 mg  BID


 IV  1/21/25 22:00


    1/26/25 09:56


40 MG


 


 Docusate Sodium  100 mg  BIDPRN  PRN


 PO  1/21/25 16:15


     





 


 Cyanocobalamin  100 mcg  DAILY


 SUBCUT  1/24/25 10:00


    1/26/25 09:57


100 MCG


 


 Multivit/Ca Carb/


 B Cmplx/FA/Prenat  1 tab  DAILY


 PO  1/24/25 10:00


    1/26/25 09:56


1 TAB


 


 Melatonin  10 mg  HS


 PO  1/23/25 22:00


    1/25/25 21:52


10 MG











Laboratory Results


Laboratory Tests


1/24/25 06:00








1/25/25 06:12











Urinalysis








Test


 1/19/25


02:30 1/20/25


14:30


 


Urine Color


 Yellow


(Yellow) 





 


Urine Clarity Clear (Clear)   


 


Urine pH 5.5 (5.0-9.0)   


 


Urine Specific Gravity


 1.010


(1.001-1.035) 





 


Urine Protein


 Trace


(Negative)  H 





 


Urine Ketones


 Negative


(Negative) 





 


Urine Blood


 Negative /uL


(Negative) 





 


Urine Nitrite


 Negative


(Negative) 





 


Urine Bilirubin


 Negative


(Negative) 





 


Urine Urobilinogen


 Normal mg/dL


(Negative) 





 


Urine Leukocyte Esterase


 Negative /uL


(Negative) 





 


Urine RBC


 <1 /hpf (0 -


4) 





 


Urine WBC


 5 /hpf (0 - 5)


 





 


Urine Squamous Epithelial


Cells Few /hpf (<5)  


 





 


Urine Bacteria


 None seen /hpf


(None Seen) 





 


Urine Glucose


 Normal mg/dL


(Normal) 





 


Urine Osmolality  474 mOsm/kg  


 


Urine Creatinine


 


 69.04 mg/dL


(30.0-125.0)


 


Urine Protein/Creatinine Ratio  0.48  


 


Urine Sodium


 


 45 mmol/L


()


 


Urine Total Protein


 


 32.8 mg/dL


(1-14)  H








Microbiology





                                  Microbiology








 Date/Time


Source Procedure


Growth Status





 


 1/19/25 19:55


Nose MRSA Screen - Final


 Complete











Assessment/Plan


Assessment/Plan


This 70-year-old [place] mainly speaking patient accompanied by daughter 

presents in the ED via EMS with a chief complaint of headaches.  The patient 

reports headaches associated with dizziness, chest burning pain, abdominal pain,

nausea, and vomiting started yesterday.  The patient denies syncope, 

palpitations, shortness of breath, or other acute symptoms.  The patient with 

past medical history of liver cirrhosis, hepatitis, anemia, and chronic kidney 

disease stage 3.





# acute renal failure





# Hyperbilirubinemia with transaminitis


# history of autoimmune hepatitis


# hx of Liver cirrhosis with banding


# anemia





# acute headaches


# chest pain





# nausea/vomiting











01/20/2025: still has abdominal pain. nephro is following for renal failure. 

chest pain improved


01/21/2025: discussed with pt regarding elevated liver enzymes. possible d/c in 

AM


01/22/2025: pt is being evaluated by GI. GI requested a liver biopsy


01/23/2025: pending liver biopys per GI recommendation


01/24/2025: planning for biopsy within 24 hours


01/25/2025: doing okay after biopsy. still has some pain. possible d/c in 24 

hours


Plan discussed with:  Patient





Date of Service:  Jan 25, 2025


Billing Provider:  NEYMAR PEDRO DO


Common Visit Codes:  [id number] INP/OBS CARE(HIGH)











NEYMAR PEDRO DO                Jan 26, 2025 15:14

## 2025-01-26 NOTE — DVHPN2
Progress Note - Dictate


Date Seen:  Jan 26, 2025


Medical Necessity Reason


Pt with a Central, PICC or Fol:  No


Subjective


No new complaints 


Patient is resting comfortably 


Patient underwent a transjugular liver biopsy on Friday and was kept for 

observation because of mild postprocedure pain 


Patient had mild perihepatic ascites making transabdominal liver biopsy possibly

more dangerous


Her labs were stable and she had no active GI bleeding; hemoglobin stable at 

10.1


She was diagnosed with suspected autoimmune hepatitis by her hepatologist at 

[facility]


vital signs





                                   Vital Sign








  Date Time  Temp Pulse Resp B/P (MAP) Pulse Ox O2 Delivery O2 Flow Rate FiO2


 


1/26/25 16:59 97.7 52 17 99/44 (62) 96   





 97.7       


 


1/26/25 07:55      Room Air* 0 21














                           Total Intake and Output   


 


 1/25/25 1/25/25 1/26/25





 15:00 23:00 07:00


 


Intake Total   285 ml


 


Balance   285 ml








objective


General Appearance:  Alert, Oriented X3, Cooperative, no distress


HEENT:  Atraumatic, PERRLA, EOMI, Mucous membr. moist/pink; mild scleral icterus


Respiratory:  Clear to auscultation, Normal air movement


Cardiovascular:  Regular rate, Normal S1, Normal S2


Abdominal:  Normal bowel sounds, Soft, No tenderness


Extremities:  No clubbing, No cyanosis, No edema, Normal pulses


Skin:  No rashes, No breakdown, No significant lesion


Neuro:  Normal gait, Normal speech, Strength at 5/5 X4 ext, Normal tone


Psych/Mental Status:  Mental status NL


laboratory and microbiology


                                Laboratory Tests


1/25/25 06:12








1/24/25 06:00

















Test


 1/25/25


06:12 Range/Units


 


 


Serum Glucose 120 H   mg/dL








Problems(with codes):  


(1) Jaundice


(2) Cirrhosis of liver


(3) Autoimmune hepatitis


(4) Scleroderma


(5) Ascites


(6) Macrocytic anemia


(7) Transaminitis


(8) Weakness


(9) Acute-on-chronic kidney injury


Prognosis


Plan 





Liver enzymes are trending down 


Discharge planning is in progress 


Patient needs to be discharged on a low dose of steroids 


She was advised to follow up in my office as an outpatient for ongoing 

management and further treatment 


Await liver biopsy results





Dietary Evaluation Review


Comments:  


Follow current diet regimen, consider protein restriction if pt's kdney  


function worsens and not schedule for dialysis.


Expected Outcomes/Goals:  


improved lab values, void uremic syndrome, gradual wt loss.


Plan discussed with:  Patient, Daughter, Other (Dr Kirby)











PAL MAN MD                Jan 26, 2025 21:50

## 2025-01-26 NOTE — DVHDS2
Discharge Summary


Date of Admission


Jan 19, 2025 at 09:40





Date of Discharge:


Jan 24, 2025





Labs/Diagnostic Data:





                               Laboratory Results








Test


 1/25/25


06:12 1/24/25


06:00 1/22/25


05:49 1/21/25


16:40


 


Sodium Level


 136 mmol/L


(136-145) 


 


 





 


Potassium Level


 4.2 mmol/L


(3.5-5.1) 


 


 





 


Chloride Level


 107 mmol/L


() 


 


 





 


Carbon Dioxide Level


 21 mmol/L


(20-31) 


 


 





 


Anion Gap 8 (5-15)    


 


Blood Urea Nitrogen


 31 mg/dL


(9-23) 


 


 





 


Creatinine


 1.22 mg/dL


(0.550-1.02) 


 


 





 


Glomerular Filtration Rate


Calc 48 mL/min


(>90) 


 


 





 


BUN/Creatinine Ratio


 25.4


(10.0-20.0) 


 


 





 


Serum Glucose


 120 mg/dL


() 


 


 





 


Calcium Level


 9.9 mg/dL


(8.7-10.4) 


 


 





 


Phosphorus Level


 2.8 mg/dL


(2.4-5.1) 


 


 





 


Magnesium Level


 2.4 mg/dL


(1.6-2.6) 


 


 





 


Total Bilirubin


 3.1 mg/dL


(0.2-1.0) 


 


 





 


Aspartate Amino Transferase


(AST) 69 U/L (13-40) 


 


 


 





 


Alanine Aminotransferase (ALT) 127 U/L (7-40)    


 


Alkaline Phosphatase


 134 U/L


() 


 


 





 


Total Protein


 5.7 g/dL


(5.7-8.2) 


 


 





 


Albumin


 3.5 g/dL


(3.2-4.8) 


 


 





 


White Blood Count


 


 12.1 10^3/uL


(4.4-10.8) 


 





 


Red Blood Count


 


 2.89 10^6/uL


(4.0-5.20) 


 





 


Hemoglobin


 


 10.1 g/dL


(12.2-16.2) 


 





 


Hematocrit


 


 31.1 %


(36.0-46.0) 


 





 


Mean Corpuscular Volume


 


 107.9 fL


(80.0-100.0) 


 





 


Mean Corpuscular Hemoglobin


 


 35.0 pg


(28.0-32.0) 


 





 


Mean Corpuscular Hemoglobin


Concent 


 32.5 g/dL


(32.0-36.0) 


 





 


Red Cell Distribution Width


 


 19.8 %


(11.8-14.3) 


 





 


Platelet Count


 


 77 10^3/uL


(140-450) 


 





 


Mean Platelet Volume


 


 8.4 fL


(6.9-10.8) 


 





 


Neutrophils (%) (Auto)


 


 95.5 %


(37.0-80.0) 


 





 


Lymphocytes (%) (Auto)


 


 1.8 %


(10.0-50.0) 


 





 


Monocytes (%) (Auto)


 


 2.6 %


(0.0-12.0) 


 





 


Eosinophils (%) (Auto)


 


 0.0 %


(0.0-7.0) 


 





 


Basophils (%) (Auto)


 


 0.1 %


(0.0-2.0) 


 





 


Neutrophils # (Auto)


 


 11.5 10 ^3/uL


(1.6-8.6) 


 





 


Lymphocytes # (Auto)


 


 0.2 10 ^3/uL


(0.4-5.4) 


 





 


Monocytes # (Auto)


 


 0.3 10 ^3/uL


(0-1.3) 


 





 


Eosinophils # (Auto)


 


 0 10 ^3/uL


(0-0.8) 


 





 


Basophils # (Auto)


 


 0 10 ^3/uL


(0-0.2) 


 





 


Nucleated Red Blood Cells  0.1 %   


 


Platelet Estimate  Decreased   


 


Macrocytosis  Moderate   


 


Prothrombin Time


 


 10.6 sec


(9.3-11.8) 


 





 


Prothrombin Time INR


 


 1.00


(0.9-1.15) 


 





 


Hepatitis A IgM Antibody   Negative  


 


Free Thyroxine (T4) Calculated


 


 


 


 0.90 ng/dL


(0.89-1.76)


 


Free Triiodothyronine (T3)


pg/mL 


 


 


 1.75 pg/mL


(2.3-4.2)


 


Anti-Nuclear Antibody Comment    Comment (.) 


 


MYRIAM-1 Antibody


 


 


 


 <0.2 AI


(0.0-0.9)


 


SS-A/Ro Antibody


 


 


 


 <0.2 AI


(0.0-0.9)


 


SS-B/La Antibody


 


 


 


 <0.2 AI


(0.0-0.9)


 


Sm Antibody


 


 


 


 <0.2 AI


(0.0-0.9)


 


RNP Antibody


 


 


 


 <0.2 AI


(0.0-0.9)


 


Scl-70 (Scleroderma) Antibody


 


 


 


 <0.2 AI


(0.0-0.9)


 


Anti-Double Strand DNA


Antibody 


 


 


 <1 IU/mL (0-9) 





 


Chromatin Antibody


 


 


 


 0.4 AI


(0.0-0.9)


 


Centromere B Antibody


 


 


 


 >8.0 AI


(0.0-0.9)


 


Test


 1/21/25


15:30 1/20/25


14:30 1/20/25


05:38 1/19/25


10:09


 


Large Platelets Few    


 


Anisocytosis (manual) Slight    


 


Urine Osmolality  474 mOsm/kg   


 


Urine Creatinine


 


 69.04 mg/dL


(30.0-125.0) 


 





 


Urine Protein/Creatinine Ratio  0.48   


 


Urine Sodium


 


 45 mmol/L


() 


 





 


Urine Total Protein


 


 32.8 mg/dL


(1-14) 


 





 


Vitamin D 25-Hydroxy


 


 


 14.3 ng/mL


(30.0-100) 





 


Activated Partial


Thromboplast Time 


 


 


 < 20.0 SEC


(24.5-34.5)


 


Ammonia


 


 


 


 29 umol/L


(11-32)


 


Test


 1/19/25


03:32 1/19/25


02:30 1/19/25


00:26 





 


Troponin I High Sensitivity


 21 ng/L


(</=34) 


 


 





 


Triglycerides Level


 180 mg/dL (<


150) 


 


 





 


Cholesterol Level


 185 mg/dL (<


200) 


 


 





 


LDL Cholesterol


 121 mg/dL (<


100) 


 


 





 


HDL Cholesterol


 34 mg/dL


(40-59) 


 


 





 


Thyroid Stimulating Hormone


(TSH) 0.29 uIU/mL


(0.55-4.78) 


 


 





 


Urine Color


 


 Yellow


(Yellow) 


 





 


Urine Clarity  Clear (Clear)   


 


Urine pH  5.5 (5.0-9.0)   


 


Urine Specific Gravity


 


 1.010


(1.001-1.035) 


 





 


Urine Protein


 


 Trace


(Negative) 


 





 


Urine Ketones


 


 Negative


(Negative) 


 





 


Urine Blood


 


 Negative /uL


(Negative) 


 





 


Urine Nitrite


 


 Negative


(Negative) 


 





 


Urine Bilirubin


 


 Negative


(Negative) 


 





 


Urine Urobilinogen


 


 Normal mg/dL


(Negative) 


 





 


Urine Leukocyte Esterase


 


 Negative /uL


(Negative) 


 





 


Urine RBC


 


 <1 /hpf (0 -


4) 


 





 


Urine WBC  5 /hpf (0 - 5)   


 


Urine Squamous Epithelial


Cells 


 Few /hpf (<5) 


 


 





 


Urine Bacteria


 


 None seen /hpf


(None Seen) 


 





 


Urine Glucose


 


 Normal mg/dL


(Normal) 


 





 


Tear Drop Cells   Few  


 


Stomatocytes   Few  


 


Lactic Acid Level


 


 


 1.5 mmol/L


(0.4-2.0) 





 


B-Type Natriuretic Peptide


 


 


 157.54 pg/mL


(0-100) 





 


Lipase   34 U/L (12-53)  


 


Hepatitis A Antibody Total


 


 


 Positive


(Negative) 





 


Hepatitis B Surface Antigen


 


 


 Negative


(Negative) 





 


Hepatitis B Surface Antibody


 


 


 Negative


(Negative) 





 


Hepatitis B Core Total


Antibody 


 


 Negative


(Negative) 





 


Hepatitis C Antibody


 


 


 Negative


(Negative) 








                             Other Laboratory Tests


1/25/25 06:12








1/24/25 06:00











Brief Hx & Hospital Course:


This 70-year-old Armenian mainly speaking patient accompanied by daughter 

presents in the ED via EMS with a chief complaint of headaches.  The patient 

reports headaches associated with dizziness, chest burning pain, abdominal pain,

nausea, and vomiting started yesterday.  The patient denies syncope, 

palpitations, shortness of breath, or other acute symptoms.  The patient with 

past medical history of liver cirrhosis, hepatitis, anemia, and chronic kidney 

disease stage 3.





This 70-year-old Armenian mainly speaking patient accompanied by daughter 

presents in the ED via EMS with a chief complaint of headaches.  The patient 

reports headaches associated with dizziness, chest burning pain, abdominal pain,

nausea, and vomiting started yesterday.  The patient denies syncope, 

palpitations, shortness of breath, or other acute symptoms.  The patient with 

past medical history of liver cirrhosis, hepatitis, anemia, and chronic kidney 

disease stage 3.





# acute renal failure





# Hyperbilirubinemia with transaminitis


# history of autoimmune hepatitis


# hx of Liver cirrhosis with banding


# anemia





# acute headaches


# chest pain





# nausea/vomiting











01/20/2025: still has abdominal pain. nephro is following for renal failure. 

chest pain improved


01/21/2025: discussed with pt regarding elevated liver enzymes. possible d/c in 

AM


01/22/2025: pt is being evaluated by GI. GI requested a liver biopsy


01/23/2025: pending liver biopys per GI recommendation


01/24/2025: planning for biopsy within 24 hours


01/25/2025: doing okay after biopsy. still has some pain. possible d/c in 24 

hours


01/26/2025: discharged to home





Condition at Discharge:


Good





Final Diagnosis/Problems List





see above





Discharge Disposition:


Home





Discharge Instruct/Medications


Diet:  Cardiac 2g Na,low cholest


Activity:  No Restrictions, As Tolerated


Discharge Statement:


"Patient was advised to return to the ER or call 911 if any headaches, 

dizziness, shortness of breath, chest pain, abdominal pain, bleeding, fevers, or

worsening of medical condition.





Patient was counseled about treatment plan, medications, possible side effects, 

patientverbalized understanding. All questions were answered to the best of my 

ability.





This discharge took greater then 30 minutes in planning, reviewing 

documentation, counseling the patient, and discussing with other team members."





ASSESSMENT








Date of Service:  Jan 26, 2025


Billing Provider:  NEYMAR PEDRO DO


Common Visit Codes:  04599-XZO/OBS DISCH DAY >30min











NEYMAR PEDRO DO                Jan 26, 2025 15:15

## 2025-01-26 NOTE — DVHPN2
Reviewed:  Care Plan, H&P, Labs, Medications, Previous Orders, Radiology


Changes from previous H/P or p:  No Changes


General:  Per HPI


Eyes:  No Pain, No Vision change, No Conjunctivae inflammation, No Eyelid 

inflammation, No Other, No Redness


ENT:  No Ear pain, No Ear discharge, No Nose pain, No Nose discharge, No Nose 

congestion, No Mouth pain, No Mouth swelling, No Throat pain, No Throat 

swelling, No Other


Cardiovascular:  Chest Pain


Respiratory:  No Cough, No Dry, No Shortness of breath, No SOB with excertion, 

No Wheezing, No Hemoptysis, No Pleuritic Pain, No Sputum, No Other


Gastrointestinal:  Nausea, Vomiting, Abdominal Pain


Genitourinary:  No Dysuria, No Frequency, No Incontinence, No Hematuria, No 

Retention, No Other


Musculoskeletal:  No other, No neck pain, No shoulder pain, No arm pain, No back

pain, No hand pain, No leg pain, No foot pain


Skin:  No Rash, No Lesions, No Jaundice, No Bruising, No Other





Objective


Vitals





Vital Signs








  Date Time  Temp Pulse Resp B/P (MAP) Pulse Ox O2 Delivery O2 Flow Rate FiO2


 


1/26/25 13:00 97.8 56 16 95/40 (58) 97   





 97.8       


 


1/26/25 07:55      Room Air* 0 21








Intake/Output











                               Intake and Output 


 


 1/26/25





 07:00


 


Intake Total 285 ml


 


Balance 285 ml


 


 


 


Intake Oral 285 ml


 


# Voids 2








General Appearance:  Alert, Oriented X3, Cooperative


Cardiovascular:  Regular rate, Normal S1, Normal S2


Abdomen:  Normal bowel sounds, Soft


Medications





                               Current Medications








 Medications  Dose


 Ordered  Sig/Kiran


 Route  Start Time


 Stop Time Status Last Admin


Dose Admin


 


 Acetaminophen/


 Hydrocodone Bitart  1 tab  Q4HP  PRN


 PO  1/19/25 09:45


    1/26/25 08:01


1 TAB


 


 Ondansetron HCl  4 mg  Q4HP  PRN


 IV  1/19/25 09:45


    1/19/25 10:14


4 MG


 


 Acetaminophen  650 mg  Q6HP  PRN


 PO  1/19/25 09:45


     





 


 Morphine Sulfate  2 mg  Q4HPRN  PRN


 IV  1/19/25 09:45


     





 


 Nitroglycerin  0.4 mg  Q5MINP  PRN


 SL  1/19/25 09:45


     





 


 Morphine Sulfate  2 mg  Q30M  PRN


 IV  1/19/25 09:45


     





 


 Midodrine  10 mg  TID@0600,1200,1800


 PO  1/20/25 12:00


    1/26/25 11:54


10 MG


 


 Octreotide Acetate  100 mcg  TID


 SUBCUT  1/20/25 14:00


    1/26/25 14:14


100 MCG


 


 Methylprednisolone


 Sodium Succinate  40 mg  BID


 IV  1/21/25 22:00


    1/26/25 09:56


40 MG


 


 Docusate Sodium  100 mg  BIDPRN  PRN


 PO  1/21/25 16:15


     





 


 Cyanocobalamin  100 mcg  DAILY


 SUBCUT  1/24/25 10:00


    1/26/25 09:57


100 MCG


 


 Multivit/Ca Carb/


 B Cmplx/FA/Prenat  1 tab  DAILY


 PO  1/24/25 10:00


    1/26/25 09:56


1 TAB


 


 Melatonin  10 mg  HS


 PO  1/23/25 22:00


    1/25/25 21:52


10 MG











Laboratory Results


Laboratory Tests


1/24/25 06:00








1/25/25 06:12











Urinalysis








Test


 1/19/25


02:30 1/20/25


14:30


 


Urine Color


 Yellow


(Yellow) 





 


Urine Clarity Clear (Clear)   


 


Urine pH 5.5 (5.0-9.0)   


 


Urine Specific Gravity


 1.010


(1.001-1.035) 





 


Urine Protein


 Trace


(Negative)  H 





 


Urine Ketones


 Negative


(Negative) 





 


Urine Blood


 Negative /uL


(Negative) 





 


Urine Nitrite


 Negative


(Negative) 





 


Urine Bilirubin


 Negative


(Negative) 





 


Urine Urobilinogen


 Normal mg/dL


(Negative) 





 


Urine Leukocyte Esterase


 Negative /uL


(Negative) 





 


Urine RBC


 <1 /hpf (0 -


4) 





 


Urine WBC


 5 /hpf (0 - 5)


 





 


Urine Squamous Epithelial


Cells Few /hpf (<5)  


 





 


Urine Bacteria


 None seen /hpf


(None Seen) 





 


Urine Glucose


 Normal mg/dL


(Normal) 





 


Urine Osmolality  474 mOsm/kg  


 


Urine Creatinine


 


 69.04 mg/dL


(30.0-125.0)


 


Urine Protein/Creatinine Ratio  0.48  


 


Urine Sodium


 


 45 mmol/L


()


 


Urine Total Protein


 


 32.8 mg/dL


(1-14)  H








Microbiology





                                  Microbiology








 Date/Time


Source Procedure


Growth Status





 


 1/19/25 19:55


Nose MRSA Screen - Final


 Complete








Labs and/or images reviewed:  Labs reviewed by me, Image(s) reviewed by me





Assessment/Plan


Assessment/Plan


This 70-year-old [place] mainly speaking patient accompanied by daughter 

presents in the ED via EMS with a chief complaint of headaches.  The patient 

reports headaches associated with dizziness, chest burning pain, abdominal pain,

nausea, and vomiting started yesterday.  The patient denies syncope, 

palpitations, shortness of breath, or other acute symptoms.  The patient with 

past medical history of liver cirrhosis, hepatitis, anemia, and chronic kidney 

disease stage 3.





# acute renal failure





# Hyperbilirubinemia with transaminitis


# history of autoimmune hepatitis


# hx of Liver cirrhosis with banding


# anemia





# acute headaches


# chest pain





# nausea/vomiting











01/20/2025: still has abdominal pain. nephro is following for renal failure. 

chest pain improved


01/21/2025: discussed with pt regarding elevated liver enzymes. possible d/c in 

AM


01/22/2025: pt is being evaluated by GI. GI requested a liver biopsy


01/23/2025: pending liver biopys per GI recommendation


01/24/2025: planning for biopsy within 24 hours


Plan discussed with:  Patient





Date of Service:  Jan 24, 2025


Billing Provider:  NEYMAR PEDRO DO


Common Visit Codes:  [id number] INP/OBS CARE(HIGH)











NEYMAR PEDRO DO                Jan 26, 2025 15:13

## 2025-01-26 NOTE — DVHPN2
Progress Note


Date Seen:  Jan 26, 2025


Medical Necessity Reason


Pt with a Central, PICC or Fol:  No





Subjective


Patient reports:  No new complaints


Other Systems:  


Patient seen and examined by myself in follow-up today





Objective


vital signs





                                   Vital Sign








  Date Time  Temp Pulse Resp B/P (MAP) Pulse Ox O2 Delivery O2 Flow Rate FiO2


 


1/26/25 09:00 98.6 50 16 92/41 (58) 98   





 98.6       


 


1/26/25 07:55      Room Air* 0 21














                           Total Intake and Output   


 


 1/25/25 1/25/25 1/26/25





 15:00 23:00 07:00


 


Intake Total   285 ml


 


Balance   285 ml








medications





                               Current Medications








 Medications  Dose


 Ordered  Sig/Kiran


 Route  Start Time


 Stop Time Status Last Admin


Dose Admin


 


 Acetaminophen/


 Hydrocodone Bitart  1 tab  Q4HP  PRN


 PO  1/19/25 09:45


    1/26/25 08:01


1 TAB


 


 Ondansetron HCl  4 mg  Q4HP  PRN


 IV  1/19/25 09:45


    1/19/25 10:14


4 MG


 


 Acetaminophen  650 mg  Q6HP  PRN


 PO  1/19/25 09:45


     





 


 Morphine Sulfate  2 mg  Q4HPRN  PRN


 IV  1/19/25 09:45


     





 


 Nitroglycerin  0.4 mg  Q5MINP  PRN


 SL  1/19/25 09:45


     





 


 Morphine Sulfate  2 mg  Q30M  PRN


 IV  1/19/25 09:45


     





 


 Midodrine  10 mg  TID@0600,1200,1800


 PO  1/20/25 12:00


    1/26/25 05:25


10 MG


 


 Octreotide Acetate  100 mcg  TID


 SUBCUT  1/20/25 14:00


    1/26/25 05:32


100 MCG


 


 Methylprednisolone


 Sodium Succinate  40 mg  BID


 IV  1/21/25 22:00


    1/26/25 09:56


40 MG


 


 Docusate Sodium  100 mg  BIDPRN  PRN


 PO  1/21/25 16:15


     





 


 Cyanocobalamin  100 mcg  DAILY


 SUBCUT  1/24/25 10:00


    1/26/25 09:57


100 MCG


 


 Multivit/Ca Carb/


 B Cmplx/FA/Prenat  1 tab  DAILY


 PO  1/24/25 10:00


    1/26/25 09:56


1 TAB


 


 Melatonin  10 mg  HS


 PO  1/23/25 22:00


    1/25/25 21:52


10 MG








Examination:  LUNGS:Normal, CVS:Normal, MSK:Normal


laboratory and microbiology


                                Laboratory Tests


1/25/25 06:12








1/24/25 06:00

















Test


 1/25/25


06:12 Range/Units


 


 


Serum Glucose 120 H   mg/dL








                                  Microbiology








 Date/Time


Source Procedure


Growth Status





 


 1/19/25 19:55


Nose MRSA Screen - Final


 Complete











Problem List/Assessment/Plan


Problem List/Assessment/Plan


Acute kidney injury likely hepatorenal syndrome


Hepatic encephalopathy


Hypotension


Nonalcoholic liver cirrhosis


Microcytic anemia


Hyponatremia due to excess H2O





Recommendations


Kidney function continues to improve


No urine output charted


Land catheter 


strict I&Os


Fluid restriction


kidney ultrasound reported within normal limits


Midodrine 10 mg p.o. t.i.d.


Octreotide 100 mcg subcu q.8 hours


GI consult








I will sign off this case please refer to my office two weeks after discharge 

for Acute kidney injury follow-up


Thank you for the kind


Plan discussed with:  Patient, Daughter





Dietary Evaluation Review


Comments:  


Follow current diet regimen, consider protein restriction if pt's kdney  


function worsens and not schedule for dialysis.


Expected Outcomes/Goals:  


improved lab values, void uremic syndrome, gradual wt loss.











ANDIE FLORES MD        Jan 26, 2025 11:22

## 2025-01-28 NOTE — DVH
XY Transjugintrahepatic,



HISTORY: TRANS JUGULAR HEP. BX for cirrhosis with suspected autoimmune hepatitis
and ascites present.





PROCEDURE: Informed consent was obtained. The patient was placed in supine 
position on the fluoroscopic table. The right neck site was prepped with 
chlorhexidine which was allowed to dry and draped in sterile fashion. Time out 
was performed. A micropuncture set was used to access the internal jugular vein 
under real-time ultrasound guidance, and an image documenting patency was 
recorded to PACS. Following serial dilation, a 8 [place] vascular sheath was 
placed. A 5 [place] MPA catheter was used to select the right hepatic vein using 
small amounts of hand contrast injections for visualization in the AP and 
lateral projections. The catheter was then exchanged to a 7 [place] transjugular 
biopsy set, and 1 core biopsies were obtained, placed in formalin solution, and 
sent to pathology. The catheter and sheath were removed and hemostasis achieved 
with manual pressure No immediate complication was identified.



DAP 2450.08



FLUOROSCOPY TIME: 16.6 minutes.



CONTRAST USED: 50 mL .





SEDATION: Dr. QUIQUE Blanco was personally responsible for the administration of 
moderate sedation during the procedure performed, including the use of an 
independent trained observer who had no other duties during the procedure. The 
drugs utilized were IV fentanyl and versed (see nursing log for details). The 
total time of supervision by the attending physician was approximately 75 
minutes.



FINDINGS:

1. Widely patent right internal jugular vein. Patent right hepatic vein.





IMPRESSION:



Transjugular liver biopsy with a core sample obtained and send to pathology for 
analysis in formalin.









Electronically signed by Wilver Blanco at 01/24/2025 4:34 PM CST

CRUZ